# Patient Record
Sex: FEMALE | Race: WHITE | URBAN - METROPOLITAN AREA
[De-identification: names, ages, dates, MRNs, and addresses within clinical notes are randomized per-mention and may not be internally consistent; named-entity substitution may affect disease eponyms.]

---

## 2017-01-20 ENCOUNTER — OFFICE VISIT - HEALTHEAST (OUTPATIENT)
Dept: FAMILY MEDICINE | Facility: CLINIC | Age: 52
End: 2017-01-20

## 2017-01-20 DIAGNOSIS — M54.9 BACK PAIN: ICD-10-CM

## 2017-01-20 DIAGNOSIS — M51.26 HERNIATED LUMBAR DISC WITHOUT MYELOPATHY: ICD-10-CM

## 2017-01-20 DIAGNOSIS — E66.9 OBESITY: ICD-10-CM

## 2017-01-20 ASSESSMENT — MIFFLIN-ST. JEOR: SCORE: 1540.7

## 2017-01-24 ENCOUNTER — HOSPITAL ENCOUNTER (OUTPATIENT)
Dept: PHYSICAL MEDICINE AND REHAB | Facility: CLINIC | Age: 52
Discharge: HOME | End: 2017-01-24
Attending: FAMILY MEDICINE

## 2017-01-24 ENCOUNTER — HOSPITAL ENCOUNTER (OUTPATIENT)
Dept: RADIOLOGY | Facility: HOSPITAL | Age: 52
Discharge: HOME | End: 2017-01-24
Attending: PHYSICIAN ASSISTANT

## 2017-01-24 DIAGNOSIS — M54.16 LUMBAR RADICULITIS: ICD-10-CM

## 2017-01-24 DIAGNOSIS — M54.50 LUMBALGIA: ICD-10-CM

## 2017-01-24 DIAGNOSIS — M79.18 MYOFASCIAL PAIN: ICD-10-CM

## 2017-01-24 DIAGNOSIS — G47.9 SLEEP DISTURBANCE: ICD-10-CM

## 2017-01-24 ASSESSMENT — MIFFLIN-ST. JEOR: SCORE: 1542.06

## 2017-01-25 ENCOUNTER — COMMUNICATION - HEALTHEAST (OUTPATIENT)
Dept: PHYSICAL MEDICINE AND REHAB | Facility: CLINIC | Age: 52
End: 2017-01-25

## 2017-02-03 ENCOUNTER — OFFICE VISIT - HEALTHEAST (OUTPATIENT)
Dept: PHYSICAL THERAPY | Facility: REHABILITATION | Age: 52
End: 2017-02-03

## 2017-02-03 DIAGNOSIS — M62.81 MUSCLE WEAKNESS (GENERALIZED): ICD-10-CM

## 2017-02-03 DIAGNOSIS — M54.16 RIGHT LUMBAR RADICULITIS: ICD-10-CM

## 2017-02-03 DIAGNOSIS — M79.18 MYOFASCIAL PAIN: ICD-10-CM

## 2017-02-03 DIAGNOSIS — M54.50 LUMBALGIA: ICD-10-CM

## 2017-02-03 DIAGNOSIS — R29.3 ABNORMAL POSTURE: ICD-10-CM

## 2017-02-03 DIAGNOSIS — M54.41 ACUTE BILATERAL LOW BACK PAIN WITH RIGHT-SIDED SCIATICA: ICD-10-CM

## 2017-02-03 DIAGNOSIS — M53.86 DECREASED ROM OF LUMBAR SPINE: ICD-10-CM

## 2017-02-07 ENCOUNTER — OFFICE VISIT - HEALTHEAST (OUTPATIENT)
Dept: PHYSICAL THERAPY | Facility: REHABILITATION | Age: 52
End: 2017-02-07

## 2017-02-07 DIAGNOSIS — M53.86 DECREASED ROM OF LUMBAR SPINE: ICD-10-CM

## 2017-02-07 DIAGNOSIS — M79.18 MYOFASCIAL PAIN: ICD-10-CM

## 2017-02-07 DIAGNOSIS — M62.81 MUSCLE WEAKNESS (GENERALIZED): ICD-10-CM

## 2017-02-07 DIAGNOSIS — M54.41 ACUTE BILATERAL LOW BACK PAIN WITH RIGHT-SIDED SCIATICA: ICD-10-CM

## 2017-02-07 DIAGNOSIS — M54.16 RIGHT LUMBAR RADICULITIS: ICD-10-CM

## 2017-02-14 ENCOUNTER — OFFICE VISIT - HEALTHEAST (OUTPATIENT)
Dept: PHYSICAL THERAPY | Facility: REHABILITATION | Age: 52
End: 2017-02-14

## 2017-02-14 DIAGNOSIS — M79.18 MYOFASCIAL PAIN: ICD-10-CM

## 2017-02-14 DIAGNOSIS — M62.81 MUSCLE WEAKNESS (GENERALIZED): ICD-10-CM

## 2017-02-14 DIAGNOSIS — M54.16 RIGHT LUMBAR RADICULITIS: ICD-10-CM

## 2017-02-14 DIAGNOSIS — M53.86 DECREASED ROM OF LUMBAR SPINE: ICD-10-CM

## 2017-02-14 DIAGNOSIS — M54.41 ACUTE BILATERAL LOW BACK PAIN WITH RIGHT-SIDED SCIATICA: ICD-10-CM

## 2017-02-14 DIAGNOSIS — R29.3 ABNORMAL POSTURE: ICD-10-CM

## 2017-04-18 ENCOUNTER — HOSPITAL ENCOUNTER (OUTPATIENT)
Dept: PHYSICAL MEDICINE AND REHAB | Facility: CLINIC | Age: 52
Discharge: HOME | End: 2017-04-18
Attending: PHYSICIAN ASSISTANT

## 2017-04-18 DIAGNOSIS — M43.06 LUMBAR SPONDYLOLYSIS: ICD-10-CM

## 2017-04-18 DIAGNOSIS — M54.16 LUMBAR RADICULITIS: ICD-10-CM

## 2017-04-18 DIAGNOSIS — M54.50 LUMBALGIA: ICD-10-CM

## 2017-04-18 DIAGNOSIS — M79.18 MYOFASCIAL PAIN: ICD-10-CM

## 2017-04-18 DIAGNOSIS — G47.9 SLEEP DISTURBANCE: ICD-10-CM

## 2017-05-18 ENCOUNTER — OFFICE VISIT - HEALTHEAST (OUTPATIENT)
Dept: FAMILY MEDICINE | Facility: CLINIC | Age: 52
End: 2017-05-18

## 2017-05-18 ENCOUNTER — COMMUNICATION - HEALTHEAST (OUTPATIENT)
Dept: FAMILY MEDICINE | Facility: CLINIC | Age: 52
End: 2017-05-18

## 2017-05-18 DIAGNOSIS — R50.9 FEVER: ICD-10-CM

## 2017-05-18 DIAGNOSIS — J02.9 SORE THROAT: ICD-10-CM

## 2017-05-18 DIAGNOSIS — Z20.818 STREP THROAT EXPOSURE: ICD-10-CM

## 2017-05-18 ASSESSMENT — MIFFLIN-ST. JEOR: SCORE: 1541.24

## 2017-05-30 ENCOUNTER — HOSPITAL ENCOUNTER (OUTPATIENT)
Dept: PHYSICAL MEDICINE AND REHAB | Facility: CLINIC | Age: 52
Discharge: HOME | End: 2017-05-30
Attending: PHYSICIAN ASSISTANT

## 2017-05-30 DIAGNOSIS — M25.562 KNEE PAIN, BILATERAL: ICD-10-CM

## 2017-05-30 DIAGNOSIS — G47.9 SLEEP DISTURBANCE: ICD-10-CM

## 2017-05-30 DIAGNOSIS — M47.817 FACET ARTHROPATHY, LUMBOSACRAL: ICD-10-CM

## 2017-05-30 DIAGNOSIS — M43.06 LUMBAR SPONDYLOLYSIS: ICD-10-CM

## 2017-05-30 DIAGNOSIS — M54.50 LUMBALGIA: ICD-10-CM

## 2017-05-30 DIAGNOSIS — M54.16 LUMBAR RADICULITIS: ICD-10-CM

## 2017-05-30 DIAGNOSIS — M79.18 MYOFASCIAL PAIN: ICD-10-CM

## 2017-05-30 DIAGNOSIS — M25.561 KNEE PAIN, BILATERAL: ICD-10-CM

## 2017-05-30 ASSESSMENT — MIFFLIN-ST. JEOR: SCORE: 1554.76

## 2017-06-01 ENCOUNTER — HOSPITAL ENCOUNTER (OUTPATIENT)
Dept: MRI IMAGING | Facility: HOSPITAL | Age: 52
Discharge: HOME | End: 2017-06-01
Attending: PHYSICIAN ASSISTANT

## 2017-06-01 ENCOUNTER — HOSPITAL ENCOUNTER (OUTPATIENT)
Dept: RADIOLOGY | Facility: HOSPITAL | Age: 52
Discharge: HOME | End: 2017-06-01
Attending: PHYSICIAN ASSISTANT

## 2017-06-01 DIAGNOSIS — M25.561 KNEE PAIN, BILATERAL: ICD-10-CM

## 2017-06-01 DIAGNOSIS — M54.50 LUMBALGIA: ICD-10-CM

## 2017-06-01 DIAGNOSIS — M43.06 LUMBAR SPONDYLOLYSIS: ICD-10-CM

## 2017-06-01 DIAGNOSIS — M25.562 KNEE PAIN, BILATERAL: ICD-10-CM

## 2017-06-01 DIAGNOSIS — G47.9 SLEEP DISTURBANCE: ICD-10-CM

## 2017-06-08 ENCOUNTER — HOSPITAL ENCOUNTER (OUTPATIENT)
Dept: PHYSICAL MEDICINE AND REHAB | Facility: CLINIC | Age: 52
Discharge: HOME | End: 2017-06-08
Attending: PHYSICIAN ASSISTANT

## 2017-06-08 DIAGNOSIS — M25.562 CHRONIC PAIN OF BOTH KNEES: ICD-10-CM

## 2017-06-08 DIAGNOSIS — G47.9 SLEEP DISTURBANCE: ICD-10-CM

## 2017-06-08 DIAGNOSIS — M43.06 LUMBAR SPONDYLOLYSIS: ICD-10-CM

## 2017-06-08 DIAGNOSIS — M51.26 LUMBAR DISC HERNIATION: ICD-10-CM

## 2017-06-08 DIAGNOSIS — M25.561 CHRONIC PAIN OF BOTH KNEES: ICD-10-CM

## 2017-06-08 DIAGNOSIS — G89.29 CHRONIC PAIN OF BOTH KNEES: ICD-10-CM

## 2017-06-08 DIAGNOSIS — M79.18 MYOFASCIAL PAIN: ICD-10-CM

## 2017-06-08 DIAGNOSIS — M47.817 FACET ARTHROPATHY, LUMBOSACRAL: ICD-10-CM

## 2017-06-08 DIAGNOSIS — M54.50 LUMBALGIA: ICD-10-CM

## 2017-06-15 ENCOUNTER — OFFICE VISIT - HEALTHEAST (OUTPATIENT)
Dept: PHYSICAL THERAPY | Facility: REHABILITATION | Age: 52
End: 2017-06-15

## 2017-06-15 DIAGNOSIS — M54.50 LOW BACK PAIN: ICD-10-CM

## 2017-06-15 DIAGNOSIS — M25.662 DECREASED ROM OF LEFT KNEE: ICD-10-CM

## 2017-06-15 DIAGNOSIS — M54.50 CHRONIC MIDLINE LOW BACK PAIN WITHOUT SCIATICA: ICD-10-CM

## 2017-06-15 DIAGNOSIS — M43.06 LUMBAR SPONDYLOLYSIS: ICD-10-CM

## 2017-06-15 DIAGNOSIS — M25.561 CHRONIC PAIN OF BOTH KNEES: ICD-10-CM

## 2017-06-15 DIAGNOSIS — M62.81 MUSCLE WEAKNESS (GENERALIZED): ICD-10-CM

## 2017-06-15 DIAGNOSIS — M25.661 DECREASED ROM OF RIGHT KNEE: ICD-10-CM

## 2017-06-15 DIAGNOSIS — R29.3 ABNORMAL POSTURE: ICD-10-CM

## 2017-06-15 DIAGNOSIS — G89.29 CHRONIC MIDLINE LOW BACK PAIN WITHOUT SCIATICA: ICD-10-CM

## 2017-06-15 DIAGNOSIS — G89.29 CHRONIC PAIN OF BOTH KNEES: ICD-10-CM

## 2017-06-15 DIAGNOSIS — M25.562 CHRONIC PAIN OF BOTH KNEES: ICD-10-CM

## 2017-06-15 DIAGNOSIS — M47.817 FACET ARTHROPATHY, LUMBOSACRAL: ICD-10-CM

## 2017-06-15 DIAGNOSIS — M79.18 MYOFASCIAL PAIN: ICD-10-CM

## 2017-06-19 ENCOUNTER — OFFICE VISIT - HEALTHEAST (OUTPATIENT)
Dept: PHYSICAL THERAPY | Facility: REHABILITATION | Age: 52
End: 2017-06-19

## 2017-06-19 DIAGNOSIS — M43.06 LUMBAR SPONDYLOLYSIS: ICD-10-CM

## 2017-06-19 DIAGNOSIS — R29.3 ABNORMAL POSTURE: ICD-10-CM

## 2017-06-19 DIAGNOSIS — M25.662 DECREASED ROM OF LEFT KNEE: ICD-10-CM

## 2017-06-19 DIAGNOSIS — M62.81 MUSCLE WEAKNESS (GENERALIZED): ICD-10-CM

## 2017-06-19 DIAGNOSIS — M25.561 CHRONIC PAIN OF BOTH KNEES: ICD-10-CM

## 2017-06-19 DIAGNOSIS — G89.29 CHRONIC MIDLINE LOW BACK PAIN WITHOUT SCIATICA: ICD-10-CM

## 2017-06-19 DIAGNOSIS — M54.50 CHRONIC MIDLINE LOW BACK PAIN WITHOUT SCIATICA: ICD-10-CM

## 2017-06-19 DIAGNOSIS — M47.817 FACET ARTHROPATHY, LUMBOSACRAL: ICD-10-CM

## 2017-06-19 DIAGNOSIS — M25.661 DECREASED ROM OF RIGHT KNEE: ICD-10-CM

## 2017-06-19 DIAGNOSIS — M25.562 CHRONIC PAIN OF BOTH KNEES: ICD-10-CM

## 2017-06-19 DIAGNOSIS — G89.29 CHRONIC PAIN OF BOTH KNEES: ICD-10-CM

## 2017-06-27 ENCOUNTER — OFFICE VISIT - HEALTHEAST (OUTPATIENT)
Dept: PHYSICAL THERAPY | Facility: REHABILITATION | Age: 52
End: 2017-06-27

## 2017-06-27 DIAGNOSIS — M79.18 MYOFASCIAL PAIN: ICD-10-CM

## 2017-06-27 DIAGNOSIS — M62.81 MUSCLE WEAKNESS (GENERALIZED): ICD-10-CM

## 2017-06-27 DIAGNOSIS — M43.06 LUMBAR SPONDYLOLYSIS: ICD-10-CM

## 2017-06-27 DIAGNOSIS — R29.3 ABNORMAL POSTURE: ICD-10-CM

## 2017-06-27 DIAGNOSIS — M25.662 DECREASED ROM OF LEFT KNEE: ICD-10-CM

## 2017-06-27 DIAGNOSIS — G89.29 CHRONIC PAIN OF BOTH KNEES: ICD-10-CM

## 2017-06-27 DIAGNOSIS — M47.817 FACET ARTHROPATHY, LUMBOSACRAL: ICD-10-CM

## 2017-06-27 DIAGNOSIS — M25.561 CHRONIC PAIN OF BOTH KNEES: ICD-10-CM

## 2017-06-27 DIAGNOSIS — G89.29 CHRONIC MIDLINE LOW BACK PAIN WITHOUT SCIATICA: ICD-10-CM

## 2017-06-27 DIAGNOSIS — M25.562 CHRONIC PAIN OF BOTH KNEES: ICD-10-CM

## 2017-06-27 DIAGNOSIS — M25.661 DECREASED ROM OF RIGHT KNEE: ICD-10-CM

## 2017-06-27 DIAGNOSIS — M54.50 CHRONIC MIDLINE LOW BACK PAIN WITHOUT SCIATICA: ICD-10-CM

## 2017-09-27 ENCOUNTER — OFFICE VISIT - HEALTHEAST (OUTPATIENT)
Dept: FAMILY MEDICINE | Facility: CLINIC | Age: 52
End: 2017-09-27

## 2017-09-27 ENCOUNTER — RECORDS - HEALTHEAST (OUTPATIENT)
Dept: MAMMOGRAPHY | Facility: CLINIC | Age: 52
End: 2017-09-27

## 2017-09-27 DIAGNOSIS — Z12.31 ENCOUNTER FOR SCREENING MAMMOGRAM FOR MALIGNANT NEOPLASM OF BREAST: ICD-10-CM

## 2017-09-27 DIAGNOSIS — R21 RASH: ICD-10-CM

## 2017-09-27 DIAGNOSIS — Z12.31 VISIT FOR SCREENING MAMMOGRAM: ICD-10-CM

## 2017-09-27 DIAGNOSIS — Z12.4 ENCOUNTER FOR SCREENING FOR CERVICAL CANCER: ICD-10-CM

## 2017-09-27 DIAGNOSIS — R07.0 CHRONIC THROAT PAIN: ICD-10-CM

## 2017-09-27 DIAGNOSIS — J02.9 SORE THROAT: ICD-10-CM

## 2017-09-27 DIAGNOSIS — Z11.3 SCREEN FOR STD (SEXUALLY TRANSMITTED DISEASE): ICD-10-CM

## 2017-09-27 DIAGNOSIS — Z23 NEED FOR VACCINATION FOR DTAP: ICD-10-CM

## 2017-09-27 DIAGNOSIS — G89.29 CHRONIC THROAT PAIN: ICD-10-CM

## 2017-09-27 RX ORDER — TRIAMCINOLONE ACETONIDE 0.25 MG/ML
LOTION TOPICAL
Qty: 60 ML | Refills: 0 | Status: SHIPPED | OUTPATIENT
Start: 2017-09-27

## 2017-09-27 ASSESSMENT — MIFFLIN-ST. JEOR: SCORE: 1545.21

## 2017-10-02 LAB
HPV INTERPRETATION - HISTORICAL: NORMAL
HPV INTERPRETER - HISTORICAL: NORMAL

## 2017-10-05 LAB
BKR LAB AP ABNORMAL BLEEDING: NO
BKR LAB AP BIRTH CONTROL/HORMONES: ABNORMAL
BKR LAB AP CERVICAL APPEARANCE: NORMAL
BKR LAB AP GYN ADEQUACY: ABNORMAL
BKR LAB AP GYN INTERPRETATION: ABNORMAL
BKR LAB AP HPV REFLEX: ABNORMAL
BKR LAB AP LMP: ABNORMAL
BKR LAB AP PATIENT STATUS: ABNORMAL
BKR LAB AP PREVIOUS ABNORMAL: NO
BKR LAB AP PREVIOUS NORMAL: ABNORMAL
HIGH RISK?: NO
PATH REPORT.COMMENTS IMP SPEC: ABNORMAL
RESULT FLAG (HE HISTORICAL CONVERSION): ABNORMAL

## 2017-10-06 ENCOUNTER — COMMUNICATION - HEALTHEAST (OUTPATIENT)
Dept: FAMILY MEDICINE | Facility: CLINIC | Age: 52
End: 2017-10-06

## 2017-10-11 ENCOUNTER — RECORDS - HEALTHEAST (OUTPATIENT)
Dept: ADMINISTRATIVE | Facility: OTHER | Age: 52
End: 2017-10-11

## 2017-10-31 ENCOUNTER — OFFICE VISIT - HEALTHEAST (OUTPATIENT)
Dept: FAMILY MEDICINE | Facility: CLINIC | Age: 52
End: 2017-10-31

## 2017-10-31 ENCOUNTER — COMMUNICATION - HEALTHEAST (OUTPATIENT)
Dept: FAMILY MEDICINE | Facility: CLINIC | Age: 52
End: 2017-10-31

## 2017-10-31 ENCOUNTER — RECORDS - HEALTHEAST (OUTPATIENT)
Dept: GENERAL RADIOLOGY | Facility: CLINIC | Age: 52
End: 2017-10-31

## 2017-10-31 DIAGNOSIS — Z00.00 ROUTINE GENERAL MEDICAL EXAMINATION AT A HEALTH CARE FACILITY: ICD-10-CM

## 2017-10-31 DIAGNOSIS — R87.619 ABNORMAL PAP SMEAR OF CERVIX: ICD-10-CM

## 2017-10-31 DIAGNOSIS — E66.9 OBESITY: ICD-10-CM

## 2017-10-31 DIAGNOSIS — K59.00 CONSTIPATION: ICD-10-CM

## 2017-10-31 DIAGNOSIS — R79.89 ELEVATED TSH: ICD-10-CM

## 2017-10-31 DIAGNOSIS — Z23 NEED FOR IMMUNIZATION AGAINST INFLUENZA: ICD-10-CM

## 2017-10-31 DIAGNOSIS — K59.00 CONSTIPATION, UNSPECIFIED: ICD-10-CM

## 2017-10-31 LAB
CHOLEST SERPL-MCNC: 251 MG/DL
FASTING STATUS PATIENT QL REPORTED: YES
HBA1C MFR BLD: 5.6 % (ref 3.5–6)
HDLC SERPL-MCNC: 57 MG/DL
LDLC SERPL CALC-MCNC: 161 MG/DL
TRIGL SERPL-MCNC: 163 MG/DL

## 2017-10-31 RX ORDER — POLYETHYLENE GLYCOL 3350 17 G/17G
17 POWDER, FOR SOLUTION ORAL 2 TIMES DAILY
Qty: 510 G | Refills: 4 | Status: SHIPPED | OUTPATIENT
Start: 2017-10-31

## 2017-10-31 ASSESSMENT — MIFFLIN-ST. JEOR: SCORE: 1566.78

## 2017-11-01 ENCOUNTER — OFFICE VISIT - HEALTHEAST (OUTPATIENT)
Dept: OTOLARYNGOLOGY | Facility: CLINIC | Age: 52
End: 2017-11-01

## 2017-11-01 DIAGNOSIS — G89.29 CHRONIC THROAT PAIN: ICD-10-CM

## 2017-11-01 DIAGNOSIS — R07.0 CHRONIC THROAT PAIN: ICD-10-CM

## 2017-12-01 ENCOUNTER — COMMUNICATION - HEALTHEAST (OUTPATIENT)
Dept: FAMILY MEDICINE | Facility: CLINIC | Age: 52
End: 2017-12-01

## 2018-02-08 ENCOUNTER — AMBULATORY - HEALTHEAST (OUTPATIENT)
Dept: LAB | Facility: CLINIC | Age: 53
End: 2018-02-08

## 2018-02-08 DIAGNOSIS — R79.89 ELEVATED TSH: ICD-10-CM

## 2018-02-08 LAB — TSH SERPL DL<=0.005 MIU/L-ACNC: 2.49 UIU/ML (ref 0.3–5)

## 2018-06-29 ENCOUNTER — OFFICE VISIT - HEALTHEAST (OUTPATIENT)
Dept: FAMILY MEDICINE | Facility: CLINIC | Age: 53
End: 2018-06-29

## 2018-06-29 DIAGNOSIS — J01.00 ACUTE NON-RECURRENT MAXILLARY SINUSITIS: ICD-10-CM

## 2018-06-29 ASSESSMENT — MIFFLIN-ST. JEOR: SCORE: 1542.37

## 2018-09-05 ENCOUNTER — OFFICE VISIT - HEALTHEAST (OUTPATIENT)
Dept: FAMILY MEDICINE | Facility: CLINIC | Age: 53
End: 2018-09-05

## 2018-09-05 DIAGNOSIS — Z12.11 SCREEN FOR COLON CANCER: ICD-10-CM

## 2018-09-05 DIAGNOSIS — H01.00A BLEPHARITIS OF BOTH UPPER AND LOWER EYELID OF RIGHT EYE, UNSPECIFIED TYPE: ICD-10-CM

## 2018-09-05 DIAGNOSIS — J30.81 CHRONIC ALLERGIC RHINITIS DUE TO ANIMAL HAIR AND DANDER: ICD-10-CM

## 2018-09-05 ASSESSMENT — MIFFLIN-ST. JEOR: SCORE: 1550.33

## 2018-09-25 ENCOUNTER — COMMUNICATION - HEALTHEAST (OUTPATIENT)
Dept: FAMILY MEDICINE | Facility: CLINIC | Age: 53
End: 2018-09-25

## 2018-09-25 RX ORDER — CETIRIZINE HYDROCHLORIDE 10 MG/1
10 TABLET ORAL DAILY
Qty: 30 TABLET | Refills: 2 | Status: SHIPPED | OUTPATIENT
Start: 2018-09-25

## 2019-05-23 ENCOUNTER — COMMUNICATION - HEALTHEAST (OUTPATIENT)
Dept: FAMILY MEDICINE | Facility: CLINIC | Age: 54
End: 2019-05-23

## 2019-05-23 DIAGNOSIS — Z12.11 SCREENING FOR COLON CANCER: ICD-10-CM

## 2019-07-30 ENCOUNTER — COMMUNICATION - HEALTHEAST (OUTPATIENT)
Dept: FAMILY MEDICINE | Facility: CLINIC | Age: 54
End: 2019-07-30

## 2021-05-29 NOTE — TELEPHONE ENCOUNTER
Patient can choose - since she has some GI problems, I'd push gently toward colonoscopy so they can be sure all is well in her colon.

## 2021-05-29 NOTE — TELEPHONE ENCOUNTER
Pt is overdue for colonoscopy.  Does PCP wish to order a cologuard or refer for colonoscopy?  Thanks.

## 2021-05-29 NOTE — TELEPHONE ENCOUNTER
Called pt who states she has no insurance now to schedule but will schedule once she obtains insurance.  Pt states she is not having any GI issues right now. Thanks.

## 2021-05-30 VITALS — HEIGHT: 66 IN | WEIGHT: 207.3 LBS | BODY MASS INDEX: 33.32 KG/M2

## 2021-05-30 VITALS — HEIGHT: 66 IN | WEIGHT: 207 LBS | BODY MASS INDEX: 33.27 KG/M2

## 2021-05-30 VITALS — BODY MASS INDEX: 33.33 KG/M2 | WEIGHT: 206.5 LBS

## 2021-05-30 NOTE — TELEPHONE ENCOUNTER
New order for colonoscopy placed 6/11/19 but pt declined as is not having any GI issues currently.  Date reset for 3/10/26.  Thanks.

## 2021-05-31 VITALS — HEIGHT: 66 IN | BODY MASS INDEX: 34.19 KG/M2 | WEIGHT: 212.75 LBS

## 2021-05-31 VITALS — BODY MASS INDEX: 33.77 KG/M2 | HEIGHT: 66 IN | WEIGHT: 210.1 LBS

## 2021-05-31 VITALS — HEIGHT: 66 IN | BODY MASS INDEX: 33.35 KG/M2 | WEIGHT: 207.5 LBS

## 2021-05-31 VITALS — BODY MASS INDEX: 33.29 KG/M2 | HEIGHT: 66 IN | WEIGHT: 207.12 LBS

## 2021-05-31 VITALS — BODY MASS INDEX: 33.65 KG/M2 | WEIGHT: 208.5 LBS

## 2021-06-01 VITALS — BODY MASS INDEX: 33.47 KG/M2 | HEIGHT: 66 IN | WEIGHT: 208.25 LBS

## 2021-06-01 VITALS — BODY MASS INDEX: 33.75 KG/M2 | HEIGHT: 66 IN | WEIGHT: 210 LBS

## 2021-06-08 NOTE — PROGRESS NOTES
"OFFICE VISIT NOTE      Assessment & Plan   Felicity Chaparro is a 51 y.o. female with back pain; discussed home exercises (gave handout), referred her to the spine clinic + use of pain meds. Referred her to PT. Follow up prn.    Diagnoses and all orders for this visit:    Back pain  -     Ambulatory referral to PT/OT  -     Ambulatory referral to [facility]    Herniated lumbar disc without myelopathy  -     Ambulatory referral to PT/OT  -     Ambulatory referral to [facility]    Obesity  -     Ambulatory referral to PT/OT  -     Ambulatory referral to Sp[facility]      Nhi Jeffrey MD    The following high BMI interventions were performed this visit: encouragement to exercise          Subjective:   Chief Complaint:  Back Pain    51 y.o. female.     1) back  \"pain\" and pressure - went to a different clinic ([facility]) - elida said everything was fine. Her pain is worse in the afternoon;  Pain started months ago, went away and came back; she has taken no medication for it; pain is in the spine bones - not muscles.  She describes the pain as a pressure x 8 months - comes and goes. Sometimes she has pain into her right buttock and upper right posterior thigh.  She knows she has 2 hernias - had MRI in [place] (14-15 years ago); never had an incident/accident to cause them. She was supposed to have surgery on the herniated discs but then she got pregnant and could not have the surgery.  She has done no PT and no home exercises  Her employment is to clean houses - bends a lot  Dad and brother have/had back problems  Vit B12 helps her pain (this was recommended by a doctor in [place]    She denies any numbness in her legs or feet, any tingling or lack of funtion.    Current Outpatient Prescriptions   Medication Sig     b complex vitamins tablet Take 1 tablet by mouth daily.     cetirizine (ZYRTEC) 10 MG tablet Take 1 tablet (10 mg total) by mouth daily.     ibuprofen (ADVIL,MOTRIN) 800 MG tablet Take 1 tablet (800 mg total) by " "mouth every 8 (eight) hours as needed for pain.       PSFHx: appropriate sections of PMH completed/filled in   Tobacco Status:  She  reports that she has never smoked. She has never used smokeless tobacco.    Review of Systems:  No fever.  No constipation.    Objective:      Visit Vitals     /72 (Patient Site: Left Arm, Patient Position: Sitting, Cuff Size: Adult Large)     Pulse 76     Temp 98.3  F (36.8  C) (Oral)     Resp 16     Ht 5' 6\" (1.676 m)     Wt 207 lb (93.9 kg)     BMI 33.41 kg/m2     GENERAL: No acute distress, obese  Back: appears with extra lordosis but no bruising, erythema or swelling. When I push on the vertebrae, she notes the pain is deeper than where I push. She can bend forward with only a little pain, backward, side to side with minimal pain; she can twist, too, without pain;   Extremities: +1/4 DTR on right patella, +2/4 DTR on left patella  Gait is steady, even    Greater than 25 minutes spent with patient, with greater than 50% of time spent in counseling, consultation and care coordination regarding problems detailed above.          "

## 2021-06-08 NOTE — PROGRESS NOTES
[facility] Daily Progress     Patient Name: Felicity Chaparro  PRECAUTIONS/RESTRICTIONS:  Hx-L HNP, 2 mm retrolisthesis L3 on L4  Date: 2017  Visit #: 3  PTA visit #:  0  Referral Diagnosis: Lumbalgia, Lumbar Radiculitis, Myofascial Pain  Referring provider:Nhi Mosher MD;Edith Lang PA-C  Visit Diagnosis:     ICD-10-CM    1. Right lumbar radiculitis M54.16    2. Myofascial pain M79.1    3. Muscle weakness (generalized) M62.81    4. Decreased ROM of lumbar spine M25.60    5. Acute bilateral low back pain with right-sided sciatica M54.41    6. Abnormal posture R29.3          Assessment:     HEP/POC compliance is  good .  Patient demonstrates understanding/independence with home program.  Response to Intervention good, benefit with exercises and kinesiotape  Meeting expectations.    Goal Status:  Pt. will demonstrate/verbalize independence in self-management of condition in : Progressing toward  Pt. will be independent with home exercise program in : Progressing toward  Pt. will have improved quality of sleep: Progressing toward  Comment:: less pain in right leg  Pt. will bend: Progressing toward  Patient will decrease : NATHEN score;by _ points;for improved quality of function;for improved quality of life;in 6 weeks  by ___ points: 30    Plan / Patient Education:     Continue with initial plan of care.  Progress with home program as tolerated.   Continue weekly x3.  Try 90/90 core with alternate toe tapping and bent knee during bridge    Subjective:     Pain Ratin  No C/o LE symptoms today but some in the morning yesterday.  Compliant with HEP. No itching with tape    Response to PT/benefits:  Exercises/kinesiotape are helpful.  No questions or review of exercises requested    Improvements:  Sleeping and bending with greater tolerance and less pain    Continued difficulties: morningLE symptoms    Objective:      :  Listening stations head :  Normal smooth, equal phases at 12  cycles/min  Skin condition good with slight irritation on left SI region; avoiding irritated area with re-tape.    Today's Exercises:  OPT EXERCISES 2/7/2017   Comment #2 SL core with bent knee, 5x each-H   Exercise #3 All 4's UE lift, 5x each-H     Today's exercises:    OPT EXERCISES 2/14/2017   Comment #4    Exercise #5 Bridge with knee pillow squeeze   Comment #5 Supine Alternate UE LE extension, 10x-H   Exercise #6 All 4's  alternate UE LE lifts,5x, LE lifts onlly 5x each-H     Treatment Today    TREATMENT MINUTES COMMENTS   Evaluation     Self-care/ Home management     Manual therapy  Prone with pelvic pillow:  MFR LS decompression and TL junction   Neuromuscular Re-education     Therapeutic Activity     Therapeutic Exercises 25 See flow sheet.  Kinesiotape for erector spinae .  Skin prep applied before tape.   Gait training     Modality__________________                Total 25    Blank areas are intentional and mean the treatment did not include these items.       Ghazal Bueno  2/14/2017        [facility] Discharge Summary  Patient Name: Felicity Chaparro  Date: 4/13/2017  Referral Diagnosis:  Lumbalgia, Lumbar Radiculitis, Myofascial Pain  Referring provider:  Edith Lang PA-C  Visit Diagnosis:   1. Right lumbar radiculitis     2. Myofascial pain     3. Muscle weakness (generalized)     4. Decreased ROM of lumbar spine     5. Acute bilateral low back pain with right-sided sciatica     6. Abnormal posture         Goals:  Pt. will demonstrate/verbalize independence in self-management of condition in : Progressing toward  Pt. will be independent with home exercise program in : Progressing toward  Pt. will have improved quality of sleep: Progressing toward  Comment:: less pain in right leg  Pt. will bend: Progressing toward  Patient will decrease : NATHEN score;by _ points;for improved quality of function;for improved quality of life;in 6 weeks  by ___ points: 30-status unknown/unmet    Patient was  seen for 3 visits from 2/3/2017 to 2/14/2017 with 4 missed/cancelled appointments due to being out of town.  The patient was making adequate progress toward goals and has demonstrated understanding of and independence in the home program for self-care, and progression to next steps.  She will initiate contact if questions or concerns arise.  Patient received a home program [place] Extension and core strengthening  The patient discontinued therapy, did not return.  The patient was issued kinesiotape and instructed in proper usage.    Therapy will be discontinued at this time.  The patient will need a new referral to resume.    Thank you for your referral.  Ghazal Bueno  4/13/2017  4:33 PM

## 2021-06-08 NOTE — PROGRESS NOTES
[facility]   [facility]umbo-Pelvic Initial Evaluation    Patient Name: Felicity Chaparro  PRECAUTIONS/RESTRICTIONS:  Hx-L HNP, 2 mm retrolisthesis L3 on L4  Date of evaluation: 2/3/2017  Referral Diagnosis: Lumbalgia, Lumbar Radiculitis, Myofascial Pain  Referring provider: Nhi Jeffrey MD/Edith Lang PA-C  Visit Diagnosis:     ICD-10-CM    1. Muscle weakness (generalized) M62.81    2. Lumbalgia M54.5    3. Right lumbar radiculitis M54.16    4. Myofascial pain M79.1    5. Decreased ROM of lumbar spine M25.60    6. Abnormal posture R29.3        Assessment:      Skilled PT is required to modulate pain, increase strength and ROM/flexibility through modalities, manual therapy, exercise and education  Pt. is appropriate for skilled PT intervention as outlined in the Plan of Care (POC).  Pt. is a good candidate for skilled PT services to improve pain levels and function.    Goals:  Pt. will demonstrate/verbalize independence in self-management of condition in : 6 weeks  Pt. will be independent with home exercise program in : 6 weeks  Pt. 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  Language barriers.       Plan / Patient Instructions:        Plan of Care:   Authorization / Certification Start Date: 02/03/17  Authorization / Certification End Date: 03/27/17  Authorization / Certification Number of Visits: 12  Communication with: Referral Source  Patient Related Instruction: Nature of Condition;Treatment plan and rationale;Self Care instruction;Basis of treatment;Body  mechanics;Posture;Precautions;Next steps;Expected outcome  Times per Week: 1  Number of Weeks: 12  Number of Visits: 12  Discharge Planning: Independent HEP and self-management of symptoms  Precautions / Restrictions : Hx:  L-HNP  Therapeutic Exercise: Stretching;Strengthening  Neuromuscular Reeducation: kinesio tape;posture;core  Manual Therapy: soft tissue mobilization;myofascial release  Modalities: traction;TENS  Equipment: theraband  POC and pathology of condition were reviewed with patient.  Pt. is in agreement with the Plan of Care  A Home Exercise Program (HEP) was initiated today.  Pt. was instructed in exercises by PT and patient was given a handout with detailed instructions.    Plan for next visit: see 1x/week for 5 more visits then reassess needs.  Assess , begin LS decompression, L5S1 MFR, progress with supine core, SL core, bridging with knee pillow and trial kinesiotape and PTx/TENS     Subjective:         Social information:      Occupation:   [employer]   Work Status:  35 hours/weeks   Equipment Available: None    History of Present Illness:    Felicity Chaparro is a 51 y.o. female who presents to therapy today with chief complaints of 2 month history of bilateral LBP and recent onset of left posterior thigh to ankle pain the past days.  She reports no injury.  Hx of 2  Levels Lumbar HNP, 20 years ago.   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    Patient reports benefit from:  anti-inflammatory, pain medication, heat         Objective:       15 minutes late for appointment.  Patient had arrived at 7:15 AM; 10 minutes to complete paperwork.    Note: Items left blank indicates the item was not performed or not indicated at the time of the  evaluation.    Patient Outcome Measures :    Modified Oswestry Low Back Pain Disablity Questionnaire  in %: 38   Scores range from 0-100%, where a score of 0% represents minimal pain and maximal function. The minimal clinically important difference is a score reduction of 12%.    Examination  1. Muscle weakness (generalized)     2. Lumbalgia     3. Right lumbar radiculitis     4. Myofascial pain     5. Decreased ROM of lumbar spine     6. Abnormal posture         Involved side: bilateral lumbar and right LE    Posture Observation: Standing:  C-protraction, head tilted left, right shoulder/scapula/left iliac crest high, normal to decreased L lordosis, bilat pes cavus    Palpation:  No tenderness to palpation    Repeated Motion Testing:  Does not centralize    Passive Mobility - Joint Integrity:  Not tested    LE Screen:  Gastrocsoleus bilat min loss, HS bilat nil loss, Hip ER bilat min loss,NE, Hip IR bilat mod  loss NE, Hip flex right 100o with cramp and LBP, Hip flex left 100o NE    Lumbar ROM:    Date: 2/3/2017     *Indicate scale AROM AROM AROM   Lumbar Flexion Nil loss, pain right LB     Lumbar Extension Mod loss, pain right LB/glut and into right post thigh      Right Left Right Left Right Left   Lumbar Sidebending Min loss mild LBP Min loss, NE       Lumbar Rotation Mod loss mild pain Mod loss, mild pain       Thoracic Flexion      Thoracic Extension      Thoracic Sidebending         Thoracic Rotation           Lower Extremity Strength:   No c/o weakness  Date:      LE strength/5 Right Left Right Left Right Left   Hip Flexion (L1-3)         Hip Extension (L5-S1)         Hip Abduction (L4-5)         Hip Adduction (L2-3)         Hip External Rotation         Hip Internal Rotation         Knee Extension (L3-4)         Knee Flexion         Ankle Dorsiflexion (L4-5)         Great Toe Extension (L5)         Ankle Plantar flexion (S1)         Abdominals Weak abdominal bracing       Sensation           Reflex  "Testing  Lumbar Dermatomes Right Left UE Reflexes Right Left   Iliac Crest and Groin (L1)   Biceps (C5-6)     Anterior Medial Thigh (L2)   Brachioradialis (C5-6)     Anterior Thigh, Medial Epicondyle Femur (L3)   Triceps (C7-8)     Lateral Thigh, Anterior Knee, Medial Leg/Malleolus (L4)   Marlys s test     Lateral Leg, Dorsal Foot (L5)   LE Reflexes     Lateral Foot (S1)   Patellar (L3-4)     Posterior Leg (S2)   Achilles (S1-2)     Other:   Babinski Response           Lumbar Special Tests:    Lumbar Special Tests Right Left SI Tests Right  Left   Quadrant test   SI Compression     Straight leg raise 90 90 SI Distraction     Crossover response   POSH Test     Slump   Sacral Thrust     Sit-up test  FADIR     Trunk extensor endurance test  Resisted Abduction     Prone instability test  Other:     Pubic shotgun  Other:       Tolerated exercises well.  Preferred smaller lumbar roll.    Today's HEP:  Exercises:  Exercise #1: Supine glut sets, 5x5\"-H  Comment #1: Pronelying with pelvic pillow  Exercise #2: Sitting posture instructed., body mechanics; HO issued      Treatment Today    TREATMENT MINUTES COMMENTS   Evaluation 25 lumbopelvic   Self-care/ Home management     Manual therapy     Neuromuscular Re-education     Therapeutic Activity 15 See flow sheet   Therapeutic Exercises     Gait training     Modality__________________                Total 40    Blank areas are intentional and mean the treatment did not include these items.       PT Evaluation Code: (Please list factors)  Patient History/Comorbidities:  2 month onset but 20 year hx of L disc herniations  Examination: lumbar  Clinical Presentation: stable  Clinical Decision Making: low    Patient History/  Comorbidities Examination  (body structures and functions, activity limitations, and/or participation restrictions) Clinical Presentation Clinical Decision Making (Complexity)   No documented Comorbidities or personal factors 1-2 Elements Stable and/or " uncomplicated Low   1-2 documented comorbidities or personal factor 3 Elements Evolving clinical presentation with changing characteristics Moderate   3-4 documented comorbidities or personal factors 4 or more Unstable and unpredictable High            Ghazal Bueno  2/3/2017  8:03 AM

## 2021-06-08 NOTE — PROGRESS NOTES
[facility] Daily Progress     Patient Name: Felicity Chaparro  PRECAUTIONS/RESTRICTIONS:  Hx-L HNP, 2 mm retrolisthesis L3 on L4  Date: 2017  Visit #: 2  PTA visit #:  0  Referral Diagnosis: Lumbalgia, Lumbar Radiculitis, Myofascial Pain  Referring provider: Nhi Jeffrey MD  Visit Diagnosis:     ICD-10-CM    1. Right lumbar radiculitis M54.16    2. Myofascial pain M79.1    3. Muscle weakness (generalized) M62.81    4. Decreased ROM of lumbar spine M25.60    5. Acute bilateral low back pain with right-sided sciatica M54.41          Assessment:     HEP/POC compliance is  good .  Patient demonstrates understanding/independence with home program.  Response to Intervention fair, benefit with exercises.  Limited towards meeting expectations.    Goal Status:  Pt. will demonstrate/verbalize independence in self-management of condition in : 6 weeks  Pt. will be independent with home exercise program in : 6 weeks  Pt. 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  Progress with home program as tolerated.   Continue weekly x4.  Assess response to decompression tape.  May try star for HNP if current tape application not helpful.  Try all 4's rocking again, add row for core, try bent knee lift and bridge again.    Subjective:     Pain Ratin  No C/o LE symptoms today.  Back was quite sore last night and was unable to lay on the floor.  Compliant with HEP.     Response to PT/benefits:  Exercises are helpful.  No questions    Improvements:  NO functional improvements yet.    Continued difficulties:  Sleep/bending and LE symptoms    Objective:      :  Listening  stations head and legs:  Rhythm disorganized, rolling, asymmetric  Skin condition good.    Today's Exercises:  OPT EXERCISES 2/7/2017   Comment #2 SL core with bent knee, 5x each-H   Exercise #3 All 4's UE lift, 5x each-H     Tolerated MFR well but had some symptoms in right LE upon standing.      Treatment Today    TREATMENT MINUTES COMMENTS   Evaluation     Self-care/ Home management     Manual therapy 8 Prone with pelvic pillow:  MFR LS decompression and TL junction   Neuromuscular Re-education     Therapeutic Activity     Therapeutic Exercises 20 See flow sheet.  Kinesiotape LS decompression strip.  Skin prep applied before tape.   Gait training     Modality__________________                Total 28    Blank areas are intentional and mean the treatment did not include these items.       Ghazal Bueno  2/7/2017

## 2021-06-08 NOTE — PROGRESS NOTES
ASSESSMENT:     Diagnoses and all orders for this visit:    Lumbalgia    Lumbar radiculitis    Myofascial pain    Sleep disturbance            Felicity Chaparro is a 51 y.o. female  with a BMI of Body mass index is 33.46 kg/(m^2). who presents today in consultation at the request of Nhi Villarreal MD  for new patient evaluation of acute bilateral low back pain with occasional radicular pain to the right gluteal right posterior thigh.  Patient's symptoms are likely myofascial nature, and possible disc herniation at the lower lumbar spine.  We will start with physical therapy, and medication.  I ordered lumbar x-ray to rule out lumbar pathology as fracture dislocation, listhesis.  I started patient on physical therapy, gabapentin, and Flexeril.          NATHEN:  6  WHO-5:  12    PLAN:  A shared decision making model was used.  The patient's values and choices were respected.  The following represents what was discussed and decided upon by the physician and the patient.      1.  DIAGNOSTIC TESTS:  I ordered lumbar x-rays today.  2.  PHYSICAL THERAPY:  Discussed the importance of core strengthening, ROM, stretching exercises with the patient and how each of these entities is important in decreasing pain.  Explained to the patient that the purpose of physical therapy is to teach the patient a home exercise program.  These exercises need to be performed every day in order to decrease pain and prevent future occurrences of pain.  Likened it to brushing one's teeth.  Patient will start on physical therapy.  3.  MEDICATIONS:  Gabapentin 300 mg to be taken one tablet 3 times a day with gradual increase of 1 tablet every 3 days.  Flexeril 5 mg to be taken at bedtime as needed for muscle spasm.  Side effects of the medication discussed with the patient today.  4.  INTERVENTIONS:  No therapeutic injections at this time.    5.  PATIENT EDUCATION: I thoroughly discussed the plan with the patient today.  She verbalizes  understanding and agrees with the plan.    .  FOLLOW-UP:   I'll see her back in 3 weeks.  All questions were answered.      TORIBIO Quijano, MPA-C          SUBJECTIVE:  Felicity Chaparro  Is a 51 y.o. female who presents today for new patient evaluation of low back pain.  Patient presents with bilateral back pain that occasionally radiates to the right gluteal and right posterior thigh.  She reports history of 3 weeks of these symptoms.  Today she reports dull, achy 5 out of 10 intensity at the lower back pain.  Her symptoms are aggravated by bending over, kneeling, sitting for prolonged, standing for prolonged and rates it at 7 out of 10 intensity.  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  Her symptoms are mildly alleviated by taking ibuprofen 800 mg 1 tablet twice a day with food and rates it at 5 out of 10 intensity on its best.  She denies history of motor vehicle accidents, recent trauma, back surgeries.  Patient states that she had MRI long time ago back home in [place].  He denies history of therapeutic surgery injection.Patient denies urinary or bowel incontinence, unintentional weight loss, saddle anesthesia, fever or chills, balance difficulties.      Medications:    Current Outpatient Prescriptions   Medication Sig Dispense Refill     b complex vitamins tablet Take 1 tablet by mouth daily.       ibuprofen (ADVIL,MOTRIN) 800 MG tablet Take 1 tablet (800 mg total) by mouth every 8 (eight) hours as needed for pain. 60 tablet 2     No current facility-administered medications for this encounter.        Allergies:   Allergies   Allergen Reactions     No Known Drug Allergies        PMH: History of colitis.    PSH:   section, .    Family History:  Aunt, uncle DM II.     Social History: Non smoker, no alcohol, no illicit drug use.   Social History     Social History     Marital status:      Spouse name: N/A      Number of children: 1     Years of education: N/A     Occupational History     Not on file.     Social History Main Topics     Smoking status: Never Smoker     Smokeless tobacco: Never Used     Alcohol use Not on file     Drug use: Not on file     Sexual activity: Not on file     Other Topics Concern     Not on file     Social History Narrative    Not employed               ROS:  Low back pain with occasional radicular pain to the right gluteal right posterior thigh.  Specifically negative for bowel/bladder dysfunction, fevers,chills, appetite changes, unexplained weight loss.   Otherwise 13 systems reviewed are negative.  Please see the patient's intake questionnaire from today for details.      OBJECTIVE:  PHYSICAL EXAMINATION:    CONSTITUTIONAL:  Vital signs as above.  No acute distress.  The patient is well nourished and well groomed.  PSYCHIATRIC:  The patient is awake, alert, oriented to person, place, time and answering questions appropriately with clear speech.    SKIN:  Skin over the face, bilateral lower extremities, and posterior torso is clean, dry, intact without rashes.    GAIT:  Gait is non-antalgic.  The patient is able to heel and toe walk without significant difficulty.    STANDING EXAMINATION:  Tenderness present at the L4-L5, L5-S1 bilaterally.  No sciatic notch tenderness bilaterally.  No SI joint tenderness bilaterally.  MUSCLE STRENGTH:  The patient has 5/5 strength for the bilateral hip flexors, knee flexors/extensors, ankle dorsiflexors/plantar flexors, great toe extensors, ankle evertors/invertors.  NEUROLOGICAL:  2/4 patellar, medial hamstring, and achilles reflexes bilaterally.  Negative Babinski's bilaterally.  No ankle clonus bilaterally. Sensation to light touch is intact in the bilateral L4, L5, and S1 dermatomes.  VASCULAR:  2/4  pulses bilaterally.  Bilateral lower extremities are warm.  There is no  pitting edema of the bilateral lower extremities.  ABDOMINAL:  Soft,  non-distended, non-tender throughout all quadrants.  No pulsatile mass palpated in the left lower quadrant.  LYMPH NODES:  No palpable or tender inguinal/popliteal lymph nodes.  MUSCULOSKELETAL:  Negative straight leg raise bilaterally.  Negative hip impingement bilaterally.  Negative Pal test bilaterally.    RESULTS:  No imaging to review today.

## 2021-06-10 NOTE — PROGRESS NOTES
Assessment / Plan:     Diagnoses and all orders for this visit:    Lumbalgia  -     MR Lumbar Spine Without Contrast; Future; Expected date: 5/30/17  -     XR Knee Bilateral Plus Sunrise VW; Future; Expected date: 5/30/17    Myofascial pain    Facet arthropathy, lumbosacral    Lumbar radiculitis    Sleep disturbance  -     MR Lumbar Spine Without Contrast; Future; Expected date: 5/30/17  -     XR Knee Bilateral Plus Sunrise VW; Future; Expected date: 5/30/17    Lumbar spondylolysis  -     MR Lumbar Spine Without Contrast; Future; Expected date: 5/30/17  -     XR Knee Bilateral Plus Sunrise VW; Future; Expected date: 5/30/17    Knee pain, bilateral  -     MR Lumbar Spine Without Contrast; Future; Expected date: 5/30/17  -     XR Knee Bilateral Plus Sunrise VW; Future; Expected date: 5/30/17          Felicity Chaparro is a 51 y.o. y.o. female with no past medical history,  who presents today for follow-up regarding bilateral low back pain with occasional radiation to the right gluteal, right posterior thigh.  Today patient denies any more radicular pain to the lower extremity.  She still complains of low back pain at the L5-S1 region, despite attending physical therapy and being on medication.  She also reports bilateral knee pain for years.  I ordered lumbar MRI to evaluate for facet arthropathy at the L5-S1 that is causing her current symptoms.  I ordered bilateral knee x-rays with sunrise to evaluate for ongoing bilateral knee pain.  If the MRI shows facet arthropathy at the L5-S1 we will proceed with facet steroid injection.     A shared decision making plan was used.  The patient's values and choices were respected.  The following represents what was discussed and decided upon by the physician and the patient.      1.  DIAGNOSTIC TESTS: I reviewed the lumbar MRI imaging and the report with the patient today.  He verbalizes understanding.  2.  PHYSICAL THERAPY: Patient will continue on physical therapy [facility]  program.  3.  MEDICATIONS: Patient will continue on gabapentin 300 mg, Flexeril 5 mg, as needed for pain .   4.  INTERVENTIONS: We will review the MRI and consider facet steroid injection to the L5-S1 if indicated.  We will review the knee x-rays and recommend steroid injection if indicated.    5.  PATIENT EDUCATION: I thoroughly discussed the plan with the patient today.  She verbalizes understanding and agrees with the plan.  6.  FOLLOW-UP: Patient will follow up with me in 1  weeks.  All questions were answered.      TORIBIO Quijano, MPA-C      Subjective:     Felicity Chaparro is a 51 y.o. female who presents today for follow-up regarding bilateral back pain that occasionally radiates to the right gluteal and right posterior thigh.  Today patient reports ongoing low back pain without radicular pain to the lower extremity.  She reports 2-3 out of 10 intensity pain in the lower back at the L5-S1 midline region.  Her symptoms are aggravated by bending over, standing, walking and rates it at 8 out of 10 in 10 intensity on its worst.  Her symptoms are alleviated by rest and rates it at 0 out of 10 intensity on its best.  Patient also reports bilateral knee pain for years.  She denies history of physical therapy or therapeutic steroid injection to the knees.  Patient is concerned about the ongoing low back pain, and is wondering if she needs an MRI of the lumbar spine.Patient denies urinary or bowel incontinence, unintentional weight loss, saddle anesthesia, fever or chills, balance difficulties.      Review of Systems:  Low back pain, bilateral knee pain. Patient denies urinary or bowel incontinence, unintentional weight loss, saddle anesthesia, fever or chills, balance difficulties.       Objective:   CONSTITUTIONAL:  Vital signs as above.  No acute distress.  The patient is well nourished and well groomed.    PSYCHIATRIC:  The patient is awake, alert, oriented to person, place and time.  The patient is answering  questions appropriately with clear speech.  Normal affect.  SKIN:  Skin over the face, posterior torso, bilateral upper and lower extremities is clean, dry, intact without rashes.  MUSCULOSKELETAL:  Gait is non-antalgic.  The patient is able to heel and toe walk without any difficulty.  Mild tenderness over the L5-S1 lumbar paraspinal muscles.      The patient has 5/5 strength for the bilateral hip flexors, knee flexors/extensors, ankle dorsiflexors/plantar flexors, ankle evertors/invertors.    NEUROLOGICAL:  2/4 patellar, medial hamstring, achilles reflexes which are symmetric bilaterally.  No ankle clonus bilaterally.  Sensation to light touch is intact in the bilateral L4, L5, and S1 dermatomes.       RESULTS:         XR LUMBAR SPINE 2 OR 3 VWS  1/24/2017 11:03 AM      INDICATION: Lumbar pain.   COMPARISON: None.      FINDINGS: 5 lumbar type vertebral bodies with partial sacralization of L5. Moderate degenerative disc disease changes at the L3-L4 interspace. Mild degenerative disc disease changes at the L2-L3, L4-L5 interspaces. 2 mm degenerative retrolisthesis of L3   on L4.

## 2021-06-10 NOTE — PROGRESS NOTES
OFFICE VISIT NOTE      Assessment & Plan   Felicity Chaparro is a 51 y.o. female with exposure to strep and incomplete treatment. We will give her full treatment with an alternative antibiotic. Encouraged rest, fluids, prn pain meds, too    Diagnoses and all orders for this visit:    Strep throat exposure    Fever    Sore throat    Other orders  -     Discontinue: azithromycin (ZITHROMAX) 500 MG tablet; Take 1 tablet (500 mg total) by mouth daily for 5 days. Take 1 tablet daily for 3 days.  Dispense: 5 tablet; Refill: 0  -     azithromycin (ZITHROMAX) 500 MG tablet; Take 1 tablet (500 mg total) by mouth daily for 5 days.  Dispense: 5 tablet; Refill: 0      Nhi Jeffrey MD              Subjective:   Chief Complaint:  Ear Pain and Sore Throat    51 y.o. female.     1) had a throat infection - took meds for 3 days, now her ears hurt  Pain around the ear area, pain extends down into the neck  Sometimes her ears feel plugged, but she hears ok  Took home PCN twice daily for 3 days, also tried her 's Ciprodex drops - did not help much  No fever anymore    Bad headache, too    Current Outpatient Prescriptions   Medication Sig     azithromycin (ZITHROMAX) 500 MG tablet Take 1 tablet (500 mg total) by mouth daily for 5 days.     b complex vitamins tablet Take 1 tablet by mouth daily.     cyclobenzaprine (FLEXERIL) 5 MG tablet Take 1 tablet at qhs prn for muscle spasm.     diclofenac (VOLTAREN) 50 MG EC tablet Take 1 tablet (50 mg total) by mouth 2 (two) times a day. With food.     gabapentin (NEURONTIN) 300 MG capsule Take 1 capsule (300 mg total) by mouth daily. Increase by 1 tab every 3 days.  Max dose 300 mg tid     ibuprofen (ADVIL,MOTRIN) 800 MG tablet Take 1 tablet (800 mg total) by mouth every 8 (eight) hours as needed for pain.       PSFHx: appropriate sections of PMH completed/filled in   Tobacco Status:  She  reports that she has never smoked. She has never used smokeless tobacco.    Review of Systems:  No  "rash. No diarrhea.    Objective:    /66 (Patient Site: Left Arm, Patient Position: Sitting, Cuff Size: Adult Large)  Pulse 63  Temp 98.3  F (36.8  C) (Oral)   Ht 5' 6\" (1.676 m)  Wt 207 lb 1.9 oz (93.9 kg)  SpO2 96%  BMI 33.43 kg/m2  GENERAL: No acute distress.  HEENT: eyes clear; TMs both with fluid behind but no erythema, throat with erythema but no pus  NECK: supple, moderately sized and tender LA  LUNGS: clear  Ht: reg s1s2  Skin: no rash        "

## 2021-06-10 NOTE — PROGRESS NOTES
Assessment / Plan:     Diagnoses and all orders for this visit:    Lumbalgia  -     diclofenac (VOLTAREN) 50 MG EC tablet; Take 1 tablet (50 mg total) by mouth 2 (two) times a day. With food.  Dispense: 60 tablet; Refill: 2  -     cyclobenzaprine (FLEXERIL) 5 MG tablet; Take 1 tablet at qhs prn for muscle spasm.  Dispense: 30 tablet; Refill: 1    Lumbar radiculitis  -     diclofenac (VOLTAREN) 50 MG EC tablet; Take 1 tablet (50 mg total) by mouth 2 (two) times a day. With food.  Dispense: 60 tablet; Refill: 2  -     cyclobenzaprine (FLEXERIL) 5 MG tablet; Take 1 tablet at qhs prn for muscle spasm.  Dispense: 30 tablet; Refill: 1    Myofascial pain  -     diclofenac (VOLTAREN) 50 MG EC tablet; Take 1 tablet (50 mg total) by mouth 2 (two) times a day. With food.  Dispense: 60 tablet; Refill: 2  -     cyclobenzaprine (FLEXERIL) 5 MG tablet; Take 1 tablet at qhs prn for muscle spasm.  Dispense: 30 tablet; Refill: 1    Sleep disturbance  -     diclofenac (VOLTAREN) 50 MG EC tablet; Take 1 tablet (50 mg total) by mouth 2 (two) times a day. With food.  Dispense: 60 tablet; Refill: 2  -     cyclobenzaprine (FLEXERIL) 5 MG tablet; Take 1 tablet at qhs prn for muscle spasm.  Dispense: 30 tablet; Refill: 1    Lumbar spondylolysis  -     diclofenac (VOLTAREN) 50 MG EC tablet; Take 1 tablet (50 mg total) by mouth 2 (two) times a day. With food.  Dispense: 60 tablet; Refill: 2  -     cyclobenzaprine (FLEXERIL) 5 MG tablet; Take 1 tablet at qhs prn for muscle spasm.  Dispense: 30 tablet; Refill: 1          Felicity Chaparro is a 51 y.o. y.o. female with no past medical history,  who presents today for follow-up regarding bilateral low back pain with occasional radiation to the right gluteal, right posterior thigh.  Patient reports resolution of the radicular pain to the right gluteal right posterior thigh.  She still has right-sided low back pain that is mostly myofascial in nature.  Her x-rays done on January 24 of 2017 shows only  moderate degenerative disc disease changes at the L3-L4 space.  There is 2 mm degenerative retrolisthesis L3 on L4.  I prescribed diclofenac 50 mg to be taken 1 tablet twice daily with food, refill on Flexeril 5 mg at nighttime.  I would like continue on gabapentin 3 mg 1 tablet at nighttime.  I would like patient to continue with physical therapy.  If her symptoms does not improve we will consider lumbar MRI.    A shared decision making plan was used.  The patient's values and choices were respected.  The following represents what was discussed and decided upon by the physician and the patient.      1.  DIAGNOSTIC TESTS: I reviewed the lumbar MRI imaging and the report with the patient today.  He verbalizes understanding.  2.  PHYSICAL THERAPY: Patient will continue on physical therapy [facility] program.  3.  MEDICATIONS: Patient will start on diclofenac 50 mg 1 tablet twice daily with food.  Patient will continue on gabapentin 300 mg 1 tablet at nighttime, Flexeril 5 mg at nighttime.  Side effects of the medication discussed with the patient today.    4.  INTERVENTIONS: No therapeutic injections at this time.    5.  PATIENT EDUCATION: I thoroughly discussed the plan with the patient today. Sh verbalizes understanding and agrees with the plan.  6.  FOLLOW-UP: Patient will follow up with me in 6  weeks.  All questions were answered.      TORIBIO Quijano, MPA-C      Subjective:     Felicity Chaparro is a 51 y.o. female who presents today for follow-up regarding bilateral back pain that occasionally radiates to the right gluteal and right posterior thigh.  The patient returns to the clinic reporting bilateral low back pain that is more pronounced on the right side.  She reports resolution of the radicular pain to the right lower extremity.  She has been taking gabapentin 300 mg sparingly.  She is out of Flexeril 5 mg.  She attended 2 sessions of physical therapy and did not make it to the others due to death in her family.   Today patient reports 8 out of 10 intensity pain at the right lower back.  Her symptoms are aggravated by bending over, walking, standing and rates it at 8-9 out of 10 intensity on its worst.  Her symptoms are alleviated by taking Flexeril 5 mg at nighttime and rates it at 6 out of 10 intensity on its best.  Lumbar x-rays done on January 24, 2017 showed moderate degenerative disc disease changes at the L3-L4 space.  There is 2 mm degenerative retrolisthesis of L3 on L4.  Mild degenerative disc disease at the L2-L3, L4-L5.Patient denies urinary or bowel incontinence, unintentional weight loss, saddle anesthesia, fever or chills, balance difficulties.      Review of Systems:  Right Sided low back pain. Patient denies urinary or bowel incontinence, unintentional weight loss, saddle anesthesia, fever or chills, balance difficulties.       Objective:   CONSTITUTIONAL:  Vital signs as above.  No acute distress.  The patient is well nourished and well groomed.    PSYCHIATRIC:  The patient is awake, alert, oriented to person, place and time.  The patient is answering questions appropriately with clear speech.  Normal affect.  SKIN:  Skin over the face, posterior torso, bilateral upper and lower extremities is clean, dry, intact without rashes.  MUSCULOSKELETAL:  Gait is non-antalgic.  The patient is able to heel and toe walk without any difficulty.  Mild tenderness over the L4-L5 L5-S1 lumbar paraspinal muscles.      The patient has 5/5 strength for the bilateral hip flexors, knee flexors/extensors, ankle dorsiflexors/plantar flexors, ankle evertors/invertors.    NEUROLOGICAL:  2/4 patellar, medial hamstring, achilles reflexes which are symmetric bilaterally.  No ankle clonus bilaterally.  Sensation to light touch is intact in the bilateral L4, L5, and S1 dermatomes.     Negative straight leg raise bilaterally. Negative hip impingement bilaterally. Negative Pal test bilaterally.     RESULTS:      XR LUMBAR SPINE 2 OR 3  TIFFANY  1/24/2017 11:03 AM     INDICATION: Lumbar pain.   COMPARISON: None.     FINDINGS: 5 lumbar type vertebral bodies with partial sacralization of L5. Moderate degenerative disc disease changes at the L3-L4 interspace. Mild degenerative disc disease changes at the L2-L3, L4-L5 interspaces. 2 mm degenerative retrolisthesis of L3   on L4.

## 2021-06-11 NOTE — PROGRESS NOTES
[facility] Daily Progress     Patient Name: Felicity Chaparro  Date: 6/19/2017  Visit #: 2  PTA visit #:  na  Referral Diagnosis: Chronic Bilateral Knee Pain-OA/LS Facet Arthropathy, LSpondyolysis/Myofascial Pain/  Referring provider: Edith Lang PA-C  Visit Diagnosis:     ICD-10-CM    1. Chronic pain of both knees M25.561     M25.562     G89.29    2. Muscle weakness (generalized) M62.81    3. Abnormal posture R29.3    4. Decreased ROM of left knee M25.662    5. Decreased ROM of right knee M25.661    6. Chronic midline low back pain without sciatica M54.5     G89.29    7. Facet arthropathy, lumbosacral M12.88    8. Lumbar spondylolysis M43.06          Assessment:     HEP/POC compliance is  good .  Response to Intervention Pt reported relief with KTape to mu knees and LBP. Tolerated progression of HEP without any increase in symptoms. Fatigue noted with bike after 2-3 min.  Patient is appropriate to continue with skilled physical therapy intervention, as indicated by initial plan of care.    Goal Status: on-going  Pt. will demonstrate/verbalize independence in self-management of condition in : 6 weeks  Pt. will be independent with home exercise program in : 6 weeks  Pt. 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  Progress with home program as tolerated.    Subjective:     Pain Ratin mu knees; 1/10 low back  Pt went for a walk on Thursday and her knees hurt a lot the next day for about 30 min. Her back and knees hurt \"a little bit\". Exercises are going well x1/day. Knees are hurting more than her back right now.      Objective:     Min cueing with HEP/posture  No increase in pain with progression of HEP- added bridges    Treatment Today     TREATMENT MINUTES COMMENTS   Evaluation     Self-care/ Home management     Manual therapy     Neuromuscular Re-education 8 -KTape mu knees with 2 strips each, starting at distal patellar tendon and wrapping medial/lateral knee; KTape to mu lumbar paraspinals with 3 I strips from L5 to T10 and across at L4-5; reviewed indications/precautions and skin was prepped   Therapeutic Activity     Therapeutic Exercises 17 -see exercise flow sheet   Gait training     Modality__________________                Total 25    Blank areas are intentional and mean the treatment did not include these items.       Promise Menezes, PT, DPT  2017    "

## 2021-06-11 NOTE — PROGRESS NOTES
Assessment / Plan:     Diagnoses and all orders for this visit:    Facet arthropathy, lumbosacral  -     diclofenac (VOLTAREN) 50 MG EC tablet; Take 1 tablet (50 mg total) by mouth 2 (two) times a day. with food  Dispense: 60 tablet; Refill: 2  -     cyclobenzaprine (FLEXERIL) 5 MG tablet; Take 1 tablet (5 mg total) by mouth 3 (three) times a day as needed for muscle spasms.  Dispense: 30 tablet; Refill: 0  -     Ambulatory referral to Physical Therapy    Lumbalgia    Lumbar disc herniation    Myofascial pain  -     diclofenac (VOLTAREN) 50 MG EC tablet; Take 1 tablet (50 mg total) by mouth 2 (two) times a day. with food  Dispense: 60 tablet; Refill: 2  -     cyclobenzaprine (FLEXERIL) 5 MG tablet; Take 1 tablet (5 mg total) by mouth 3 (three) times a day as needed for muscle spasms.  Dispense: 30 tablet; Refill: 0  -     Ambulatory referral to Physical Therapy    Lumbar spondylolysis  -     diclofenac (VOLTAREN) 50 MG EC tablet; Take 1 tablet (50 mg total) by mouth 2 (two) times a day. with food  Dispense: 60 tablet; Refill: 2  -     cyclobenzaprine (FLEXERIL) 5 MG tablet; Take 1 tablet (5 mg total) by mouth 3 (three) times a day as needed for muscle spasms.  Dispense: 30 tablet; Refill: 0  -     Ambulatory referral to Physical Therapy    Sleep disturbance  -     diclofenac (VOLTAREN) 50 MG EC tablet; Take 1 tablet (50 mg total) by mouth 2 (two) times a day. with food  Dispense: 60 tablet; Refill: 2  -     cyclobenzaprine (FLEXERIL) 5 MG tablet; Take 1 tablet (5 mg total) by mouth 3 (three) times a day as needed for muscle spasms.  Dispense: 30 tablet; Refill: 0  -     Ambulatory referral to Physical Therapy    Chronic pain of both knees  -     diclofenac (VOLTAREN) 50 MG EC tablet; Take 1 tablet (50 mg total) by mouth 2 (two) times a day. with food  Dispense: 60 tablet; Refill: 2  -     cyclobenzaprine (FLEXERIL) 5 MG tablet; Take 1 tablet (5 mg total) by mouth 3 (three) times a day as needed for muscle spasms.   Dispense: 30 tablet; Refill: 0  -     Ambulatory referral to Physical Therapy          Felicity Chaparro is a 51 y.o. y.o. female with no past medical history, who presents today for follobilateral low back pain with occasional radiation to the right gluteal, right posterior thigh, and bilateral knee pain.  Lumbar MRI shows facet arthropathy at the L4-L5 bilaterally.  Lumbar MRI shows the posterior disc herniation at the L4-L5 with right-sided neural foraminal narrowing that was probably causing her symptoms of radicular pain to the right gluteal,  and right posterior lateral thigh.  Patient has no radicular pain anymore after being on Gabapentin.  I recommend patient to return to physical therapy for the lower back and to continue with diclofenac 50 mg 1 tablet twice daily.  If she does not improve with physical therapy, I recommend L3, L4 medial branch block bilaterally targeting the L4-L5 facets bilaterally.    With respect to the bilateral knee pain, knee x-ray shows joint space narrowing and degenerative arthritis of both knees with marginal spurring in all 3 compartments bilaterally.  I would like patient to start in physical therapy for the knees as well and to take diclofenac 50 mg 1 tablet twice daily.  If she does not improve with physical therapy and medication,  I recommend therapeutic steroid injection to the knees.    Patient would like to continue with physical therapy for the lower back and for the knees and to continue on medication.  I provided patient with a refill of diclofenac 50 mg 1 tablet twice daily, Flexeril 5 mg muscle spasm at nighttime.    A shared decision making plan was used.  The patient's values and choices were respected.  The following represents what was discussed and decided upon by the physician and the patient.      1.  DIAGNOSTIC TESTS: I reviewed the lumbar MRI imaging and the report with the patient today.  He verbalizes understanding.  2.  PHYSICAL THERAPY: Patient will  continue on physical therapy [facility] program.  3.  MEDICATIONS: Patient will continue on diclofenac 50 mg 1 tablet twice daily, Flexeril 5 mg at nighttime for muscle spasm.    4.  INTERVENTIONS:  L3, L4 medial branch block bilaterally targeting the L4-L5 facets bilaterally, if she fails physical therapy and medication.    5.  PATIENT EDUCATION: I thoroughly discussed the plan with the patient today.  She verbalizes understanding and agrees with the plan.  6.  FOLLOW-UP: Patient will follow up with me in 6  weeks.  All questions were answered.      TORIBIO Quijano, MPA-C      Subjective:     Felicity Chaparro is a 51 y.o. female who presents today for follow-up regarding bilateral back pain that occasionally radiates to the right gluteal and right posterior thigh.  Today patient continued to report bilateral low back pain without radiculopathy to the lower extremity.  She describes her pain as dull achy 2-3 out of 10 intensity at the lower back bilaterally, at the L4-L5, L5-S1 region.  Lumbar MRI that was done on June 1 of 2017 shows posterior disc bulge at the L4-L5 with mild to moderate right neural foraminal stenosis that is probably contributing to her current symptoms.  She has a mild trefoil type spinal canal stenosis at the L3-L4 with mild right neural foraminal stenosis.    She still reports bilateral knee pain that is worse with bending, kneeling, walking, and describes her pain as dull achy 2-3 out of 10 intensity.  X-rays of the knees bilaterally done on June 1 of 2017 shows degenerative arthritis of both knees with mild medial compartment joint space narrowing and marginal spurring in all 3 compartments bilaterally.  There is a small effusion bilaterally as well with no fracture or dislocation.    Patient states that she stopped taking medication and going to physical therapy.  She stated that probably she should go back to physical therapy and start taking medication again.  She stated that she stopped the  gabapentin since she does not have radicular pain to the right lower extremity.  She denies any new trauma.    Review of Systems:  Bilateral low back pain, bilateral knee pain.Patient denies urinary or bowel incontinence, unintentional weight loss, saddle anesthesia, fever or chills, balance difficulties.       Objective:   CONSTITUTIONAL:  Vital signs as above.  No acute distress.  The patient is well nourished and well groomed.    PSYCHIATRIC:  The patient is awake, alert, oriented to person, place and time.  The patient is answering questions appropriately with clear speech.  Normal affect.  SKIN:  Skin over the face, posterior torso, bilateral upper and lower extremities is clean, dry, intact without rashes.  MUSCULOSKELETAL:  Gait is non-antalgic.  The patient is able to heel and toe walk without any difficulty.  Mild tenderness over the L4-L5, L5-S1 lumbar paraspinal muscles.      The patient has 5/5 strength for the bilateral hip flexors, knee flexors/extensors, ankle dorsiflexors/plantar flexors, ankle evertors/invertors.    NEUROLOGICAL:  2/4 patellar, medial hamstring, achilles reflexes which are symmetric bilaterally.  No ankle clonus bilaterally.  Sensation to light touch is intact in the bilateral L4, L5, and S1 dermatomes.    Positive facet loading maneuver at the lower spine at the L4-L5, L5-S1 region.     RESULTS:      [facility] [place]  MR LUMBAR SPINE WO CONTRAST  6/1/2017 7:47 AM      INDICATION: Intractable Low back pain  TECHNIQUE: Routine.  CONTRAST: None  COMPARISON: Lumbar radiographs 1/24/2017     FINDINGS: Transitional lumbosacral anatomy. Nomenclature will remain consistent with that utilized on the previous radiographs and assumes partial sacralization of L5. Mild lumbar levocurvature centered at L4. Minimal retrolisthesis at L3-4 and 3 mm   retrolisthesis at L4-5. Preserved vertebral body heights. Minor Modic type I endplate edema at L4-5. No pars defect. The conus tip is identified at  upper L1. Grossly normal paraspinal soft tissues. No abdominal aortic aneurysm. The visualized portions of   the bony pelvis are normal for age.     T12-L1: Preserved disc height and signal. Slight annular bulge without focal disc herniation. No facet arthropathy. No spinal canal stenosis. No right neural foraminal stenosis. No left neural foraminal stenosis.     L1-L2: Normal disc height and signal. No herniation. Minimal facet arthropathy. No spinal canal stenosis. No right neural foraminal stenosis. No left neural foraminal stenosis.     L2-L3: Mild loss of disc height and signal. Mild circumferential disc bulge with left foraminal and lateral disc osteophyte complex. Moderate facet arthropathy. Mild ligamentum flavum thickening. No spinal canal stenosis. No right neural foraminal   stenosis. Mild left neural foraminal stenosis.     L3-L4: Mild to moderate loss of disc height and signal. Circumferential disc osteophyte complex greater on the right. Moderate facet arthropathy. Ligamentum flavum thickening. Mild trefoil type spinal canal stenosis. Mild right neural foraminal stenosis.   No left neural foraminal stenosis.     L4-L5: Disc desiccation with mild loss of disc height posteriorly. Shallow posterior disc bulge with right greater than left discussed effect complex. Moderate facet arthropathy. Ligamentum flavum thickening. Right greater than left lateral recess   stenosis with minimal central spinal canal stenosis. Mild to moderate right neural foraminal stenosis. No left neural foraminal stenosis.     L5-S1: Transitional segment with a somewhat hypoplastic disc space. No disc herniation. No facet arthropathy. No spinal canal stenosis. No right neural foraminal stenosis. No left neural foraminal stenosis.     IMPRESSION:   CONCLUSION:  1.  Transitional lumbosacral anatomy. Nomenclature remains consistent with that utilized on previous radiographs and assumes a partially sacralized L5. Close correlation  between this exam and radiographs is advised prior to any planned prevention.  2.  At L3-4, mild trefoil type spinal canal stenosis and mild right neural foraminal stenosis.  3.  At L4-5, right greater than left lateral recess stenosis with mild to moderate right neural foraminal stenosis.  4.  At L2-3, mild left neural foraminal stenosis.    XR KNEE BILATERAL PLUS SUNRISE VW  6/1/2017 7:59 AM     INDICATION: chronic pain  COMPARISON: None.     FINDINGS: Degenerative arthritis both knees with mild medial compartment joint space narrowing, and marginal spurring in all 3 compartments bilaterally. There are likely small effusions bilaterally. No acute fracture.

## 2021-06-11 NOTE — PROGRESS NOTES
OThe patient attended therapy initially, but did not finish the therapy sessions prescribed.  Goals were not fully achieved. [facility] Daily Progress     Patient Name: Felicity Chaparro  Date: 6/27/2017  Visit #: 3  PTA visit #:  0  Referral Diagnosis: Chronic Bilateral Knee Pain-OA/LS Facet Arthropathy, LSpondyolysis/Myofascial Pain/  Referring provider: Edith Lang PA-C  Visit Diagnosis:     ICD-10-CM    1. Chronic pain of both knees M25.561     M25.562     G89.29    2. Muscle weakness (generalized) M62.81    3. Abnormal posture R29.3    4. Decreased ROM of left knee M25.662    5. Decreased ROM of right knee M25.661    6. Chronic midline low back pain without sciatica M54.5     G89.29    7. Facet arthropathy, lumbosacral M12.88    8. Lumbar spondylolysis M43.06    9. Myofascial pain M79.1          Assessment:     HEP/POC compliance is  good with initial exercises but not as compliant with bridging.  Response to Intervention good but tape didn't stick well beyond one day.  Patient is appropriate to continue with skilled physical therapy intervention, as indicated by initial plan of care.  Less pain in back; weak core  Limited knee progress.    Goal Status: on-going  Pt. will demonstrate/verbalize independence in self-management of condition in : 6 weeks  Pt. will be independent with home exercise program in : 6 weeks  Pt. 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  Progress with home program as tolerated.Try adding UE with alternate LE extension.  Try 90/90 position and lateral step ups.    Subjective:     Pain Ratin mu knees at infrapatellar; 0/10 low back  Tape to knees and back didn't stick well, lasting only one day.      Objective:     Cues required for bridging and abdominal set.  Weak core.  Today's exercises:  OPT EXERCISES 2017   Comment #3 bridge with isometric hip add 10x-H   Exercise #4    Comment #4 Kinesiotape:  skin wiped with alcohol, skin prep applied, for 2 anterior strips proximal and distal  for inflammatory knee joint and wide stabilizing strip to L4-5; issued phone number for purchase of tape   Exercise #5 SL core bilat 10x2\" each-H   Comment #5 Supine Core alternate leg extension, 10x-H     Tolerated exercises well but difficulty with core exercises.    Treatment Today     TREATMENT MINUTES COMMENTS   Evaluation     Self-care/ Home management     Manual therapy 4 Prone with pelvic pillow  MFR:  L4-5 decompression   Neuromuscular Re-education  -KTape mu knees with 2 strips each, starting at distal patellar tendon and wrapping medial/lateral knee; KTape to mu lumbar paraspinals with 3 I strips from L5 to T10 and across at L4-5; reviewed indications/precautions and skin was prepped   Therapeutic Activity     Therapeutic Exercises 50 -see exercise flow sheet   Gait training     Modality__________________                Total 54    Blank areas are intentional and mean the treatment did not include these items.       Ghazal Bueno, PT  2017       [facility] Discharge Summary  Patient Name: Felicity Chaparro  Date: 2017  Referral Diagnosis: Chronic Bilateral Knee Pain-OA/LS Facet Arthropathy, " LSpondyolysis/Myofascial Pain/  Referring provider: Edith Lang PA-C  Visit Diagnosis:   1. Chronic pain of both knees     2. Muscle weakness (generalized)     3. Abnormal posture     4. Decreased ROM of left knee     5. Decreased ROM of right knee     6. Chronic midline low back pain without sciatica     7. Facet arthropathy, lumbosacral     8. Lumbar spondylolysis     9. Myofascial pain         Goals:  Status unknown/patient did not complete all scheduled visits.  Pt. will demonstrate/verbalize independence in self-management of condition in : 6 weeks-progress toward goal  Pt. will be independent with home exercise program in : 6 weeks-progress toward goal  Pt. 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;with less difficulty;with less pain;for work;in 6 weeks  Patient will ascend / descend: stairs;with less pain;with less difficulty;in 6 weeks  Patient will sit: 60 minutes;for watching TV;with less pain;with less difficultty;in 6 weeks  Patient will increase : LEFS score;by _ points;for improved quality of function;for improved quality of life;in 6 weeks  Patient will decrease : NATHEN score;by _ points;for improved qpatient did not complete all scheduled visits but she reported no back pain when last seen and less knee painity of function;for improved quality of life  by ___ points: 12    Patient was seen for 3 visits from 6/15/2017 to 6/27/2017 with 1 missed appointment.  The patient attended therapy initially, but did not finish the therapy sessions prescribed.  Goals were not fully achieved. Explanation for goals not achieved: patient did not complete all scheduled visits but she reported no back pain when last seen  Patient received a home program core/pelvis/quad strengthening  The patient discontinued  therapy, did not return.  The patient was issued kinesiotape and instructed in proper usage.    Therapy will be discontinued at this time.  The patient will need a new referral to resume.    Thank you for your referral.  Ghazal Bueno  8/11/2017  10:14 AM

## 2021-06-11 NOTE — PROGRESS NOTES
[facility]   [facility]-Pelvic Initial Evaluation    Patient Name: Felicity Chaparro  PRECAUTIONS/RESTRICTIONS:  Chronic Hx:  LBPx15 years with HNP;  Knee pain-5 years.; 2 mm retrolisthesis L3 on L4  Date of evaluation: 6/16/2017  Referral Diagnosis: Chronic Bilateral Knee Pain-OA/LS Facet Arthropathy, LSpondyolysis/Myofascial Pain/  Referring provider: Edith Lang PA-C  Visit Diagnosis:     ICD-10-CM    1. Low back pain M54.5    2. Chronic pain of both knees M25.561     M25.562     G89.29    3. Muscle weakness (generalized) M62.81    4. Abnormal posture R29.3    5. Decreased ROM of left knee M25.662    6. Decreased ROM of right knee M25.661        Assessment:      Skilled PT is required to modulate pain, increase strength and ROM/flexibility through modalities, manual therapy, exercise and education  Pt. is appropriate for skilled PT intervention as outlined in the Plan of Care (POC).  Pt. is a good candidate for skilled PT services to improve pain levels and function.    Goals:  Pt. will demonstrate/verbalize independence in self-management of condition in : 6 weeks  Pt. will be independent with home exercise program in : 6 weeks  Pt. 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;with less difficulty;with less pain;for work;in 6 weeks  Patient will ascend / descend: stairs;with less pain;with less difficulty;in 6 weeks  Patient will sit: 60 minutes;for watching TV;with less pain;with less difficultty;in 6 weeks  Patient will increase : LEFS score;by _ points;for improved quality of function;for improved quality of life;in 6 weeks  Patient will decrease : NATHEN score;by _ points;for improved quality of function;for improved quality of life  by ___ points: 12    Patient's expectations/goals are guarded due  to chronicity of symptoms and history of L HNP    Barriers to Learning or Achieving Goals:  Chronicity of the problem.  Language barriers.       Plan / Patient Instructions:        Plan of Care:   Authorization / Certification Start Date: 06/15/17  Authorization / Certification End Date: 08/08/17  Authorization / Certification Number of Visits: 12  Communication with: Referral Source  Patient Related Instruction: Nature of Condition;Treatment plan and rationale;Self Care instruction;Basis of treatment;Body mechanics;Posture;Precautions;Next steps;Expected outcome  Times per Week: 1  Number of Weeks: 6-12  Number of Visits: 6-12  Discharge Planning: Independent HEP and self-management of symptoms  Precautions / Restrictions : Chronic Hx:  LBPx15 years with HNP;  Knee pain-5 years.  Therapeutic Exercise: ROM;Stretching;Strengthening  Neuromuscular Reeducation: kinesio tape;posture;balance/proprioception;core  Manual Therapy: soft tissue mobilization;myofascial release  Modalities: electrical stimulation;ultrasound  Equipment: theraband  POC and pathology of condition were reviewed with patient.  Pt. is in agreement with the Plan of Care  A Home Exercise Program (HEP) was initiated today.  Pt. was instructed in exercises by PT and patient was given a handout with detailed instructions.    Plan for next visit: see 1x/week for 5 more visits then reassess needs.  Begin LS decompression, L5S1 MFR, apply kinesiotape for LS decompression , add SL core hip abduction , bridge with knee pillow squeeze, supine alternate UE LE extension, partial mini or wall squats.  Working for core and knee strengthening combined.  Will hold extension ex to back due to finding of canal stenosis.     Subjective:         Social information:      Occupation:      Work Status:  35 hours/week   Equipment Available: None    History of Present Illness:    Felicity Chaparro is a 51 y.o. female who presents to therapy today with chief complaints of  persistent LBP since seen in PT Feb 2017 but resolution of LE pain from sciatica.  Chronic LBP for 15 years;  Chronic knee pain for 5 years.  She notes knee swelling after work but on weekends no notable swelling.  Extension exercises instructed during last PT episode were pain provoking.   Pt demo's signs and sx consistent with Lumbar/knee DDD/DJD.     Pain Rating current:  2  Pain rating at best: 1  Pain rating at worst: 8  Pain description: LLBP=pressure; Knee pain=general soreness    Functional limitations are described as occurring with:   Stair climbing, bending to clean at work, sit  for leisure, walk 30 minutes for work and exercise, stand for work    Patient reports benefit from:  pain patches to back; no relief for knee pain         Objective:      Note: Items left blank indicates the item was not performed or not indicated at the time of the evaluation.    Patient Outcome Measures :    Modified Oswestry Low Back Pain Disablity Questionnaire  in %: 30   Scores range from 0-100%, where a score of 0% represents minimal pain and maximal function. The minimal clinically important difference is a score reduction of 12%.  Lower Extremity Functional Scale (_/80): 34   Scores range from 0-80, where a score of 80 represents maximum function. The minimal clinically important difference is a positive change of 9 points.    Examination  1. Low back pain     2. Chronic pain of both knees     3. Muscle weakness (generalized)     4. Abnormal posture     5. Decreased ROM of left knee     6. Decreased ROM of right knee         Involved side: bilat knee pain and central LBP    Posture Observation:     Palpation:  Standing:  C-protraction, high cervical extension, right shoulder/scapula/left iliac crest high, slight pes cavus    Repeated Motion Testing:  Not tested    Passive Mobility - Joint Integrity:  Not tested    LE Screen:  Gastroc bilat min loss, soleus bilat nil-min; HS bilat 85o       Knee ROM:    Date: 6/15/2017       Knee ROM ( ) AROM in degrees AROM in degrees AROM in degrees    Right Left Right Left Right Left   Knee Flexion (0-130 ) 122 119       Knee extension (0 ) 9 10        PROM in degrees PROM in degrees PROM in degrees    Right Left Right Left Right Left   Knee Flexion (0-130 )         Knee extension (0 )               Lumbar ROM:    Date: 6/15/2017     *Indicate scale AROM AROM AROM   Lumbar Flexion Nil loss, stretch in LB     Lumbar Extension Min loss, pain      Right Left Right Left Right Left   Lumbar Sidebending Nil-min loss with stretch Nil-min loss with stretch       Lumbar Rotation Nil loss with stretch Nil loss with stretch       Thoracic Flexion      Thoracic Extension      Thoracic Sidebending         Thoracic Rotation           Lower Extremity Strength:     Date: 6/15/2017     LE strength/5 Right Left Right Left Right Left   Hip Flexion (L1-3)         Hip Extension (L5-S1)         Hip Abduction (L4-5)         Hip Adduction (L2-3)         Hip External Rotation         Hip Internal Rotation         Knee Extension (L3-4) 5 5       Knee Flexion 4 4       Ankle Dorsiflexion (L4-5)         Great Toe Extension (L5)         Ankle Plantar flexion (S1)         Abdominals Poor abdominal bracing       Sensation           Reflex Testing  Lumbar Dermatomes Right Left UE Reflexes Right Left   Iliac Crest and Groin (L1)   Biceps (C5-6)     Anterior Medial Thigh (L2)   Brachioradialis (C5-6)     Anterior Thigh, Medial Epicondyle Femur (L3)   Triceps (C7-8)     Lateral Thigh, Anterior Knee, Medial Leg/Malleolus (L4)   Marlys s test     Lateral Leg, Dorsal Foot (L5)   LE Reflexes     Lateral Foot (S1)   Patellar (L3-4)     Posterior Leg (S2)   Achilles (S1-2)     Other:   Babinski Response           Lumbar Special Tests:    Lumbar Special Tests Right Left SI Tests Right  Left   Quadrant test   SI Compression     Straight leg raise - - SI Distraction     Crossover response   POSH Test     Slump   Sacral Thrust     Sit-up  "test  FADIR     Trunk extensor endurance test  Resisted Abduction     Prone instability test  Other:     Pubic shotgun  Other:       Today's HEP:  Exercises:  Exercise #1: Quad sets, bilat 5x5\"-H  Comment #1: Supine SL with abdominal set for core and quad strength, 5x each-H  Exercise #2: Abdominal set with slight tailbone lift, 5x-H  Comment #2: Brief review of body mechanics and sitting posture.-previously issued HO   Tolerated exercises well.  Reports kinesiotape helped when used at last PT episode but back extension exercises increased pain and was advise to not do extension exercises per MD  Treatment Today    TREATMENT MINUTES COMMENTS   Evaluation 40 Back and knees   Self-care/ Home management     Manual therapy     Neuromuscular Re-education     Therapeutic Activity     Therapeutic Exercises 15 See flow sheet   Gait training     Modality__________________                Total 55    Blank areas are intentional and mean the treatment did not include these items.     PT Evaluation Code: (Please list factors)  Patient History/Comorbidities: see above  Examination: see above  Clinical Presentation: stable  Clinical Decision Making: low    Patient History/  Comorbidities Examination  (body structures and functions, activity limitations, and/or participation restrictions) Clinical Presentation Clinical Decision Making (Complexity)   No documented Comorbidities or personal factors 1-2 Elements Stable and/or uncomplicated Low   1-2 documented comorbidities or personal factor 3 Elements Evolving clinical presentation with changing characteristics Moderate   3-4 documented comorbidities or personal factors 4 or more Unstable and unpredictable High                Ghazal Bueno  6/16/2017  7:34 AM               "

## 2021-06-13 NOTE — PROGRESS NOTES
[facility]  DOS: 10/31/2017  Provider: Nhi Jeffrey MD   used: Latvian   Most recent visit at [facility]: 10/6/2017      SUBJECTIVE:     Felicity Chaparro is a 52 y.o. female who presents for an annual wellness/physical exam and also requesting to be seen for belly pain and constipation we chose to focus mostly on the constipation today.    Works today 9:30 - 5      REVIEW OF SYSTEMS:  General:  Denies problems  Eyes:  Denies problems  Ears/Nose/Throat:  Denies problems  Cardiovascular:  Denies problems  Respiratory:  Denies problems  Gastrointestinal:  as above - constipation and bloating  Genitourinary:  Denies problems  Musculoskeletal:  Denies problems  Skin:  Denies problems  Neurologic:  Denies problems  Psychiatric:  Denies problems  Endocrine:  Denies problems  Heme/Lymphatic:  Denies problems  Allergic/Immunologic:  Denies problems  14 point review of symptoms otherwise negative except as detailed in HPI.      HEALTH MAINTENANCE:    Healthy Habits & Risk Screening:     HCD (example Honoring Choices) completed? no, because did not have time to discuss it    Dental visits regularly?: yes, and most recent visit not asked    Eye Clinic visits regularly?: no, because no vision problems    Hearing Clinic visits?: no, because no hearing problems or concerns    Regular Exercise?: no, because no time    Healthy Diet?: yes, usually    Tobacco use or exposure? no      Domestic Violence: The patient reports that there is not domestic violence in her life.    Health Screenings (studies & labs):    Cervical Cancer screening: Pap recently - OK/normal    Breast Cancer screening (MAMMO): done this year    Colon Cancer screening (COLONOSCOPY)     Osteoporosis screening (DEXA): n/a    Metabolic Screenings:    Hyperlipidemia (LIPIDS PROFILE): due     Diabetes (GLUCOSE, HgbA1C): neg      Immunizations  Immunization status: up to date and documented  Immunization History   Administered Date(s)  "Administered     Tdap 09/27/2017           HISTORY:  The following portions of the patient's history were reviewed and updated as appropriate: allergies, current medications, past family history, past medical history, past social history, past surgical history and problem list.    Allergies:  Allergies   Allergen Reactions     No Known Drug Allergies        Current Medications:  Current Outpatient Prescriptions   Medication Sig     alum/mag hydrox-simethicone-diphenhydramine-lidocaine (MAGIC MOUTHWASH) suspension Swish and spit 15 mL 4 (four) times a day.     b complex vitamins tablet Take 1 tablet by mouth daily.     diclofenac (VOLTAREN) 50 MG EC tablet Take 1 tablet (50 mg total) by mouth 2 (two) times a day. with food     triamcinolone acetonide 0.025 % Lotn Apply to shoulder daily.       Current Active/Chronic Problem List:  Patient Active Problem List   Diagnosis     ASCUS of cervix with negative high risk HPV         Past Medical History:  Past Medical History:   Diagnosis Date     Herniated lumbar disc without myelopathy        Surgical History:  Past Surgical History:   Procedure Laterality Date     NO PAST SURGERIES         Family History:  Family History   Problem Relation Age of Onset     Breast cancer Neg Hx         Social History:  Social History     Social History     Marital status:      Spouse name: N/A     Number of children: 1     Years of education: N/A     Occupational History     Not on file.     Social History Main Topics     Smoking status: Never Smoker     Smokeless tobacco: Never Used     Alcohol use No     Drug use: No     Sexual activity: Not on file     Other Topics Concern     Not on file     Social History Narrative    Not employed                  OBJECTIVE:      Vitals:    10/31/17 0706   BP: 108/72   Pulse: (!) 53   Temp: 97.9  F (36.6  C)   TempSrc: Oral   SpO2: 97%   Weight: 212 lb 12 oz (96.5 kg)   Height: 5' 6\" (1.676 m)     Body mass index is 34.34 kg/(m^2). "     Physical Exam:   General Appearance: Alert, cooperative, no distress, appears stated age  Affect: Normal  Neurologic: Normal, alert and oriented x3, CN 2-12 grossly intact  Head: Normocephalic, without obvious abnormality, atraumatic  Neck: Supple, symmetrical, trachea midline, no adenopathy  Thyroid: not enlarged, symmetric, no tenderness/mass/nodules  Heart: Regular rate and rhythm, S1 and S2 normal, no murmur, rub, no edema  Lungs: Clear to auscultation bilaterally, respirations unlabored  Abdomen: Soft, obese and slightly bloated, mostly non-tender, bowel sounds active,  no masses, no organomegaly  Back: Symmetric, no curvature, ROM normal, no CVA tenderness  Extremities: Extremities normal, atraumatic, no cyanosis or edema  Skin: Skin color, texture, turgor normal, no rashes or lesions  Lymph nodes: Cervical, supraclavicular, and axillary nodes normal      Labs/Studies: A1-C normal; others pending    No visits with results within 1 Month(s) from this visit.  Latest known visit with results is:    Office Visit on 09/27/2017   Component Date Value Ref Range Status     Rapid Strep A Antigen 09/27/2017 No Group A Strep detected, presumptive negative  No Group A Strep detected, presumptive negative Final     Group A Strep by PCR 09/27/2017 No Group A Strep rRNA detected  No Group A Strep rRNA detected Final     Case Report 09/27/2017    Final                    Value:Gynecologic Cytology Report                       Case: [id number]                                   Authorizing Provider:  Pamella Solis MD              Collected:           09/27/2017 1422              Ordering Location:     [facility]            Received:            09/27/2017 1428                                     Medicine/OB                                                                  First Screen:          ABDIRAHMAN Bejarano (ASCP)                                                     Pathologist:           Josh Haas MD                                                          Specimen:    SUREPATH PAP, SCREENING, Endocervical/cervical                                              Interpretation 09/27/2017 Atypical squamous cells of undetermined significance   Final     Result Flag 09/27/2017 Abnormal* Normal Final     Specimen Adequacy 09/27/2017 Satisfactory for evaluation, endocervical/transformation zone component present   Final     HPV Reflex? 09/27/2017 Yes regardless of result   Final     HIGH RISK 09/27/2017    Final                    Value:No     LMP/Menopause Date 09/27/2017    Final                    Value:9/2013     Abnormal Bleeding 09/27/2017    Final                    Value:No     Pt Status 09/27/2017    Final                    Value:n/a     Birth Control/Hormones 09/27/2017    Final                    Value:None     Previous Normal/Date 09/27/2017    Final                    Value:unknown     Prev Abn Date/Dx 09/27/2017    Final                    Value:no     Cervical Appearance 09/27/2017    Final                    Value:normal     Chlamydia trachomatis, Amplified D* 09/27/2017 Negative  Negative Final     Neisseria gonorrhoeae, Amplified D* 09/27/2017 Negative  Negative Final     Interpretation 09/27/2017 No HPV Type(s) Detected  No HPV Type(s) Detected, No High Risk HPV Type(s) Detected, DNA Quantity Not Sufficient Final      09/27/2017 Matthew Natarajan MD, [facility]   Final      Testing was performed at [facility], [address] [place] [address], with final verification at the indicated laboratory.  Interpretation was performed at [facility] [address] [place] [id number].         ASSESSMENT & PLAN:     Patient seen today for physical/ wellness exam and additional concerns as documented below     Constipation - take miralax TID and use suppository and senna-docusate to get bowels moving. Her KUB showed a lot of gas as well as stool.    ASCUS with HPV -  followed by GYN    HEALTH MAINTENANCE/WELLNESS EXAM:     usual counseling regarding adequate and regular physical activity and well balanced diet including at least 1300 mg calcium and 1000 units vit D daily through dietary choices and/or supplements.  Advised to use sun block for skin protection, regular dental visits, eye visits as needed/appropriate. Instructed in self breast exams.     IMMUNIZATIONS: vaccines reviewed and updated as needed (any vaccines given recorded below); recommened flu vaccine each fall    CONTRACEPTION: post-menopausal status    CERVICAL CANCER SCREENING: Pap followed by OBGYN due to abnormalities    BREAST CANCER SCREENING:   Mammogram due 2018.    COLON CANCER SCREENING:   Colonoscopy: not due    OSTEOPOROSIS SCREENING:   DEXA not yet due    PROSTATE CANCER SCREENING:   PSA No results found for: PSA     LAB RESULTS: Patient will be contacted with results of labs collected today       Problem List Items Addressed This Visit     None      Visit Diagnoses     Need for immunization against influenza    -  Primary    Relevant Orders    Influenza, Seasonal,Quad Inj, 36+ MOS             Recommended follow-up:  - Recommended annual physical/wellness exams.  No Follow-up on file.

## 2021-06-13 NOTE — PROGRESS NOTES
"DEWAYNE Chaparro is a 52 y.o. female here for sore throat. Yesterday, started burning but it's kind of chronic- has had this burning in the back of her mouth and her throat for \"years.\" No pain or difficulty swallowing. No fevers. Just headache. No coughing, no runny nose.   -Doesn't smoke, no drinking. No reflux.   -Willing to do screening Pap, mammo and Tdap today.   -Believes she had a normal colonoscopy a couple of years ago through [facility].      Past Medical History:   Diagnosis Date     Herniated lumbar disc without myelopathy      Current Outpatient Prescriptions on File Prior to Visit   Medication Sig Dispense Refill     b complex vitamins tablet Take 1 tablet by mouth daily.       diclofenac (VOLTAREN) 50 MG EC tablet Take 1 tablet (50 mg total) by mouth 2 (two) times a day. with food 60 tablet 2     No current facility-administered medications on file prior to visit.      Sexual: heterosexual, Occupation: cleans [employer], Tobacco/alcohol/caffeine: no alcohol use and no tobacco use and Illicit drugs: no history of illicit drug use    Past medical, family and social history reviewed with no changes.   ?  ROS:   General: No fevers, chills, weight loss  Head: Positive for headache, not severe  Nose: no congestion  Oropharynx: + sore throat   CV: No chest pain or palpitations.  Resp: No shortness of breath or cough.   GI: No abdominal pain  MS: chronic back pain  Psych: No significant change in mood, anxiety level   ?  O  /76  Pulse 64  Temp 98.1  F (36.7  C) (Oral)   Resp 16  Ht 5' 6.14\" (1.68 m)  Wt 207 lb 8 oz (94.1 kg)  Breastfeeding? No  BMI 33.35 kg/m2   Vitals reviewed. Nursing note reviewed.  General Appearance: Pleasant and alert, in no acute distress  HEENT: TMs clear, oropharynx without edema or exudate, mucous membranes moist, neck supple, no lymphadenopathy. Thyroid is not enlarged.   CV: RRR, no murmur, rubs, gallops  Resp: No respiratory distress. Clear to auscultation bilaterally. " No wheezes, rales, rhonchi  Abd: Soft, nontender, nondistended, bowel sounds present. No masses.  Genitourinary Exam:   Vulva: normal skin.  No lesions noted.  Nontender.    Urethral meatus: normal size and location, no lesions or discharge   Urethra: no tenderness or masses   Bladder: no fullness or tenderness   Vagina: normal appearance, physiologic discharge. No evidence of cystocele or rectocele.   Cervix: normal appearance, no lesions, no cervical motion tenderness   Uterus: normal size and position, mobile, non-tender   Pap smear obtained.  Adnexa: no palpable masses bilaterally   Ext: No peripheral edema, good distal perfusion  Skin: on left shoulder, scattered macules, slightly dry to the touch, hyperpigmented  Neuro: no focal deficits, CNs II-XII normal.   A/P  Felicity was seen today for headache and sore throat.    Diagnoses and all orders for this visit:    Sore throat  -     Rapid Strep A Screen-Throat- negative.  -     Group A Strep, RNA Direct Detection, Throat    Visit for screening mammogram  -     Mammo Screening Bilateral; Future    Encounter for screening for cervical cancer   -     Gynecologic Cytology (PAP Smear)    Chronic throat pain: will try Magic Mouthwash. Unclear why she has had this chronic mouth burning/throat pain for years. It was difficulty to get a full look at her oropharynx due to her anatomy. Will refer to ENT for further evaluation and possible laryngoscopy.   -     alum/mag hydrox-simethicone-diphenhydramine-lidocaine (MAGIC MOUTHWASH) suspension; Swish and spit 15 mL 4 (four) times a day.  -     Ambulatory referral to ENT    Need for vaccination for DTaP  -     Tdap vaccine,  6yo or older,  IM    Screen for STD (sexually transmitted disease)  -     Chlamydia trachomatis & Neisseria gonorrhoeae, Amplified Detection    Rash: Rash itches, she reports. May be eczema that is irritated. Does not appear as psoriatic plaques. She appears to have what may be acanthosis nigricans on the  back of her neck and this rash on her left shoulder could be a manifestation of that. Will address metabolic health at next visit.    -     triamcinolone acetonide 0.025 % Lotn; Apply to shoulder daily.     Advised to return in 1 month for physical- will check cholesterol, likely A1C at that time.     Visit completed along with assistance of .  Options for treatment and follow-up care were reviewed with the patient and/or guardian. Felicity Chaparro and/or guardian engaged in the decision making process and verbalized understanding of the options discussed and agreed with the final plan.    Pamella Solis MD

## 2021-06-13 NOTE — PROGRESS NOTES
"HISTORY OF PRESENT ILLNESS  History via an .  She feels that there are \"balls\" in the throat.  They get inflamed and they hurt.  She was told in [place] that she had something inflamed in the throat.  About a month ago she had pain, burning, swelling and horrible smell and taste.  She was told here that she didn't have an infection.  She took Nyquil which seemed to help.  Today the \"throat balls\" are a little lower and causing some discomfort.  No fever.      REVIEW OF SYSTEMS  Review of Systems: a 10-system review was performed. Pertinent positives are noted in the HPI and on a separate scanned document in the chart.    PMH, PSH, FH and SH has documented in the EHR.      EXAM    CONSTITUTIONAL  General Appearance:  Normal, well developed, well nourished, no obvious distress  Ability to Communicate:  communicates appropriately.    HEAD AND FACE  Appearance and Symmetry:  Normal, no scalp or facial scarring or suspicious lesions.  Paranasal sinuses tenderness:  Normal, Paranasal sinuses non tender    EARS  Clinical speech reception threshold:  Normal, able to hear normal speech.  Auricle:  Normal, Auricles without scars, lesions, masses.  External auditory canal:  Normal, External auditory canal normal.  Tympanic membrane:  Normal, Tympanic membranes normal without swelling or erythema.  Tympanic membrane mobility:  Normal, Normal tympanic membrane mobility.    NOSE (speculum or scope)  Architecture:  Normal, Grossly normal external nasal architecture with no masses or lesions.  Mucosa:  Normal mucosa, No polyps or masses.  Septum:  Normal, Septum non-obstructing.  Turbinates:  Normal, No turbinate abnormalities    ORAL CAVITY AND OROPHARYNX  Lips:  Normal.  Dental and gingiva:  Normal, No obvious dental or gingival disease.  Mucosa:  Normal, Moist mucous membranes.  Tongue:  Normal, Tongue mobile with no mucosal abnormalities  Hard and soft palate:  Normal, Hard and soft palate without cleft or mucosal " lesions.  Oral pharynx:  Normal, Posterior pharynx without lesions or remarkable asymmetry.  Saliva:  Normal, Clear saliva.  Masses:  Normal, No palpable masses or pathologically enlarged lymph nodes.    lLARYNGOSCOPY  Risks and benefit of Flexible laryngeal endoscopy were discussed with the patient and they wished to proceed.  The nose was sprayed with 4% lidocaine / 1% phenylephrine.  After allowing adequate time for anesthesia the procedure was started.  Patient tolerated the procedure well.  See exam note for findings.    LARYNX AND HYPOPHARYNX (scope)  Voice Quality:  Normal voice quality.  Supra glottis:  Normal, No edema or erythema.  Epiglottis:  Normal, No epiglottic mass or mucosal lesions.  Pyriform sinuses:  Normal, Pyriform sinuses clear.  Vocal cords appearance:  Normal, Vocal cord motion symmetric.  Vocal cord motion:  Normal, Vocal cord motion symmetric  Endolarynx:  Normal, No Endolaryngeal mass or mucosal lesions.    NECK  Masses/lymph nodes:  Normal, No worrisome neck masses or lymph nodes.  Salivary glands:  Normal, Parotid and submandibular glands.  Trachea and larynx position:  Normal, Trachea and larynx midline.  Thyroid:  Normal, No thyroid abnormality.  Tenderness:  Normal, No cervical tenderness.  Suppleness:  Normal, Neck supple    NEUROLOGICAL  Speech pattern:  Normal, Proasaic    RESPIRATORY  Symmetry and Respiratory effort:  Normal, Symmetric chest movement and expansion with no increased intercostal retractions or use of accessory muscles.     IMPRESSION  The patient's exam is normal today.  I don't see any evidence of infection.  From her description she may have had a tonsillitis.  There is nothing to treat today.  I advised observation.      RECOMMENDATION  Follow up as needed if and when her symptoms return.  I explained that we should try to get her in ASAP when she develops the symptoms so that we can determine if there is something serious going on.    Devin Harris MD

## 2021-06-16 PROBLEM — R87.619 ABNORMAL PAP SMEAR OF CERVIX: Status: ACTIVE | Noted: 2017-10-31

## 2021-06-16 PROBLEM — R87.610 ASCUS OF CERVIX WITH NEGATIVE HIGH RISK HPV: Status: ACTIVE | Noted: 2017-10-05

## 2021-06-16 PROBLEM — K59.00 CONSTIPATION: Status: ACTIVE | Noted: 2017-10-31

## 2021-06-16 PROBLEM — E66.9 OBESITY: Status: ACTIVE | Noted: 2017-10-31

## 2021-06-19 NOTE — PROGRESS NOTES
Assessment: /    Plan:    1. Acute non-recurrent maxillary sinusitis  amoxicillin (AMOXIL) 875 MG tablet       Refilled cetirizine.  Recheck if any problem.  Patient was seen with professional , Layla.      Subjective:    HPI:  Felicity Chaparro is a 52-year-old female presenting with sinus congestion.  This has been occurring for 1-2 months.  She especially has nasal congestion at night.  She has postnasal drainage.  There is less congestion today.  She had sinusitis in October 2016.    She has been using triamcinolone 0.025% on her left shoulder for itching.      Review of Systems: No fever or cough.      Current Outpatient Prescriptions   Medication Sig Dispense Refill     b complex vitamins tablet Take 1 tablet by mouth daily.       alum/mag hydrox-simethicone-diphenhydramine-lidocaine (MAGIC MOUTHWASH) suspension Swish and spit 15 mL 4 (four) times a day. 240 mL 0     amoxicillin (AMOXIL) 875 MG tablet Take 1 tablet (875 mg total) by mouth 2 (two) times a day for 10 days. 20 tablet 0     cetirizine (ZYRTEC) 10 MG tablet Take 1 tablet (10 mg total) by mouth daily. 30 tablet 2     glycerin, adult, Supp rectal suppository Insert into the rectum twice daily or as needed for hard stools 25 each 3     polyethylene glycol (GLYCOLAX) 17 gram/dose powder Take 17 g by mouth 2 (two) times a day. 510 g 4     triamcinolone acetonide 0.025 % Lotn Apply to shoulder daily. 60 mL 0     No current facility-administered medications for this visit.          Objective:    Vitals:    06/29/18 1443   BP: 116/76   Pulse: 69   Resp: 17   Temp: 97.7  F (36.5  C)   SpO2: 97%       Gen:  NAD, VSS  Ears normal  Bilateral maxillary sinus tenderness  Throat normal   Neck supple without adenopathy  Lungs:  normal  Heart:  normal  Left shoulder without lesion        ADDITIONAL HISTORY SUMMARIZED (2): Reviewed October 25, 2016 note by Dr. Jeffrey.  DECISION TO OBTAIN EXTRA INFORMATION (1): None.   RADIOLOGY TESTS (1):  None.  LABS (1): None.  MEDICINE TESTS (1): None.  INDEPENDENT REVIEW (2 each): None.     Total Data Points: 2

## 2021-06-20 NOTE — PROGRESS NOTES
Subjective:   Felicity comes in today with an .  She comes in with a one-week history of itching in the right eye.  The lids of been slightly swollen.  She has had minimal drainage however from the eye.  She denies blurry vision.  She has not been exposed to any new lotions or chemicals or medications.  She does not think anything blew into her eye.  Left eye is not involved at all.  She has never really had this in the past.  She denies significant nasal congestion.      Objective:  HEENT: Conjunctivae appear clear.  The right upper and lower lids are slightly swollen.  No mattering present.  Nasal mucosa slightly inflamed bilaterally.  Good airflow noted however.  No purulent secretions present.  Pharynx today reveals no exudate or erythema.  Both TMs are gray.  Neck: Neck reveals no lymphadenopathy.  Lungs: Lungs are totally clear bilaterally.  No wheezes heard.      Assessment:  1.  Blepharitis right eye.  Rule out allergic causes.  No evidence for foreign body.  2.  Allergic rhinitis      Plan:  Patient will start Naphcon-A drops to help with allergic symptoms.  If symptoms persist despite this she will see the ophthalmologist.  If any new symptoms arise she will follow-up here.  She will continue allergy medications for her nasal congestion.

## 2021-07-03 NOTE — ADDENDUM NOTE
Addendum Note by Gill Jeffrey MD at 6/7/2019 11:55 AM     Author: Gill Jeffrey MD Service: -- Author Type: Physician    Filed: 6/7/2019 11:55 AM Encounter Date: 5/23/2019 Status: Signed    : Gill Jeffrey MD (Physician)    Addended by: GILL JEFFREY on: 6/7/2019 11:55 AM        Modules accepted: Orders